# Patient Record
Sex: FEMALE | Race: WHITE | NOT HISPANIC OR LATINO | Employment: STUDENT | URBAN - METROPOLITAN AREA
[De-identification: names, ages, dates, MRNs, and addresses within clinical notes are randomized per-mention and may not be internally consistent; named-entity substitution may affect disease eponyms.]

---

## 2017-01-23 ENCOUNTER — PATIENT MESSAGE (OUTPATIENT)
Dept: ORTHOPEDICS | Facility: CLINIC | Age: 27
End: 2017-01-23

## 2017-01-30 ENCOUNTER — OFFICE VISIT (OUTPATIENT)
Dept: ORTHOPEDICS | Facility: CLINIC | Age: 27
End: 2017-01-30
Payer: COMMERCIAL

## 2017-01-30 VITALS — BODY MASS INDEX: 21.09 KG/M2 | WEIGHT: 119 LBS | HEIGHT: 63 IN

## 2017-01-30 DIAGNOSIS — G56.12 LEFT MEDIAN NERVE NEUROPATHY: Primary | ICD-10-CM

## 2017-01-30 PROCEDURE — 1159F MED LIST DOCD IN RCRD: CPT | Mod: S$GLB,,, | Performed by: ORTHOPAEDIC SURGERY

## 2017-01-30 PROCEDURE — 99213 OFFICE O/P EST LOW 20 MIN: CPT | Mod: S$GLB,,, | Performed by: ORTHOPAEDIC SURGERY

## 2017-01-30 PROCEDURE — 99999 PR PBB SHADOW E&M-EST. PATIENT-LVL II: CPT | Mod: PBBFAC,,, | Performed by: ORTHOPAEDIC SURGERY

## 2017-01-30 NOTE — PROGRESS NOTES
HISTORY OF PRESENT ILLNESS:  Ms. Mustafa in followup of left arm symptoms.  She   did have a previous elbow injury many years ago.  A recent nerve test performed   on the left arm showed evidence of mild left carpal tunnel syndrome.  I went   over these findings with her today.  Her main complaint is weakness and   intermittent numbness, left hand.    PHYSICAL EXAMINATION:  She has atrophy of the left thumb and index finger and   thenar atrophy as well.  Tinel sign is positive at the wrist over the median   nerve, but also positive over the proximal forearm over the median nerve.    IMPRESSION:  1.  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  2.  More new onset left carpal tunnel syndrome, probably activity related.    PLAN:  We discussed some options today including injection, but she would like   to hold off for now because of a big report that she is working on.  She does   well with breaks at school, so I recommended that we continue with her current   break cycle, which gives her 10 minutes rest each hour to rest the left hand and   arm.    I would like her to continue with the brace from time to time.  Keep an eye on   symptoms.  possibly follow up in a week or two for an injection, left wrist for   treatment of carpal tunnel syndrome.      AIDAN  dd: 01/30/2017 13:52:25 (CST)  td: 01/31/2017 08:49:10 (CST)  Doc ID   #[id number]  Job ID #[id number]    CC:

## 2017-02-28 ENCOUNTER — PATIENT MESSAGE (OUTPATIENT)
Dept: ORTHOPEDICS | Facility: CLINIC | Age: 27
End: 2017-02-28

## 2017-03-16 ENCOUNTER — OFFICE VISIT (OUTPATIENT)
Dept: ORTHOPEDICS | Facility: CLINIC | Age: 27
End: 2017-03-16
Payer: COMMERCIAL

## 2017-03-16 VITALS — BODY MASS INDEX: 21.09 KG/M2 | WEIGHT: 119 LBS | HEIGHT: 63 IN

## 2017-03-16 DIAGNOSIS — G56.12 LEFT MEDIAN NERVE NEUROPATHY: ICD-10-CM

## 2017-03-16 DIAGNOSIS — G56.02 CARPAL TUNNEL SYNDROME OF LEFT WRIST: Primary | ICD-10-CM

## 2017-03-16 PROCEDURE — 99213 OFFICE O/P EST LOW 20 MIN: CPT | Mod: 25,S$GLB,, | Performed by: ORTHOPAEDIC SURGERY

## 2017-03-16 PROCEDURE — 1160F RVW MEDS BY RX/DR IN RCRD: CPT | Mod: S$GLB,,, | Performed by: ORTHOPAEDIC SURGERY

## 2017-03-16 PROCEDURE — 99999 PR PBB SHADOW E&M-EST. PATIENT-LVL II: CPT | Mod: PBBFAC,,, | Performed by: ORTHOPAEDIC SURGERY

## 2017-03-16 PROCEDURE — 20526 THER INJECTION CARP TUNNEL: CPT | Mod: LT,S$GLB,, | Performed by: ORTHOPAEDIC SURGERY

## 2017-03-16 RX ORDER — DULOXETIN HYDROCHLORIDE 30 MG/1
30 CAPSULE, DELAYED RELEASE ORAL DAILY
COMMUNITY
End: 2021-04-22

## 2017-03-16 RX ORDER — TRIAMCINOLONE ACETONIDE 40 MG/ML
40 INJECTION, SUSPENSION INTRA-ARTICULAR; INTRAMUSCULAR
Status: COMPLETED | OUTPATIENT
Start: 2017-03-16 | End: 2017-03-16

## 2017-03-16 RX ADMIN — TRIAMCINOLONE ACETONIDE 40 MG: 40 INJECTION, SUSPENSION INTRA-ARTICULAR; INTRAMUSCULAR at 02:03

## 2017-03-16 NOTE — PROGRESS NOTES
HISTORY OF PRESENT ILLNESS:  Ms. Mustafa in followup of left hand symptoms   related to chronic median nerve dysfunction with superimposed carpal tunnel   syndrome, still having the same symptoms in her left hand intermittent numbness,   weakness and difficulty with use.    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  Mildly positive Tinel sign over the median nerve at the wrist.    PLAN:  I recommended and performed an injection left carpal tunnel, combination   of Kenalog 10 mg, 0.5 mL Xylocaine, sterile technique.    I have also recommended we follow this up with a little bit of therapy ordered   at [facility] close to her house for strengthening left hand.  I will be curious to   see if this helps.  If not, we may consider surgical release in the future.      AIDAN  dd: 03/16/2017 14:22:48 (CDT)  td: 03/17/2017 07:52:45 (CDT)  Doc ID   #[id number]  Job ID #[id number]    CC:

## 2017-03-26 DIAGNOSIS — G56.12 LEFT MEDIAN NERVE NEUROPATHY: ICD-10-CM

## 2017-03-26 DIAGNOSIS — M79.642 PAIN OF LEFT HAND: ICD-10-CM

## 2017-03-27 RX ORDER — GABAPENTIN 300 MG/1
CAPSULE ORAL
Qty: 30 CAPSULE | Refills: 3 | Status: SHIPPED | OUTPATIENT
Start: 2017-03-27 | End: 2021-04-22

## 2017-04-04 ENCOUNTER — CLINICAL SUPPORT (OUTPATIENT)
Dept: REHABILITATION | Facility: HOSPITAL | Age: 27
End: 2017-04-04
Attending: ORTHOPAEDIC SURGERY
Payer: COMMERCIAL

## 2017-04-04 DIAGNOSIS — M62.542 ATROPHY OF MUSCLE OF LEFT HAND: ICD-10-CM

## 2017-04-04 DIAGNOSIS — R29.898 DECREASED GRIP STRENGTH OF LEFT HAND: Primary | ICD-10-CM

## 2017-04-04 PROCEDURE — 97165 OT EVAL LOW COMPLEX 30 MIN: CPT

## 2017-04-04 PROCEDURE — 97110 THERAPEUTIC EXERCISES: CPT

## 2017-04-06 NOTE — PLAN OF CARE
Occupational Therapy -Hand / Wrist  Evaluation    Patient: Hailey Mustafa  Date of Evaluation: 2017  Referring Physician:  Dr. Villarreal  Diagnosis: Left carpal tunnel syndrome  1. Decreased  strength of left hand     2. Atrophy of muscle of left hand       MRN: [id number]    Referral Orders:   Eval and treat     Start Time: 8:15  End Time: 9:00  Total Time: 45 min     Hand dominance: Right    Occupation:  Law student  Working presently:  no  Workmen's Compensation:  no    Date of onset: many years ago  Involved area:  Left hand  Mechanism of Injury:   Trauma , Pt reports that she fractured her elbow when she was [age] and thinks she injured her median nerve at that time.  Past Medical History/Physical Systems Review:     Environmental Concerns/ Fall Risk:  None  Barriers to Learning: None   Cultural/Spiritual : None   Developmental/Education: None   Abuse/ Neglect: none   Nutritional Deficit: None   Language: None   Hearing/Vision Deficit: None   Other:      Subjective:  Pt reports I have difficulty typing, opening jars, opening doors, and picking objects up off the floor using my left hand.    Pain   At rest: 0 out of 10  With work/ Activity: 0 out of 10  Sleepin out of 10  Location of Pain : none    Patients goals for therapy are:  increase strength in left hand    Objective:   Observation  :Atrophy, penciling of thumb and index, thenar eminence atrophy    Sensation: [place] Margi testing shows diminished protective sensation throughout median nerve distribution. Pt also complains of tingling in her left hand.  Scar / Wound:N/A  Edema:  none            Range of Motion:                                                   WNL ROM throughout left hand with the following exception:   Index Left Right  MP 84 86  PIP 65 103  DIP 55 75                                         Strength: (SB Dynamometer in lbs.), Average 3 trials, Position II: Right 16, Left 5        Pinch Strength: (Pinch Gauge  in psis), Average 3 trials:   Right Left  Key 10 4  3 pt 7 0  2 pt 5 0          Functional Limitations:   Self Care / ADL: Limited use of left hand for ADLs, grooming, hygiene  Work/Activities: Limited use of left hand for typing       Treatment Included:   OT evaluation and instruction in written HEP including  jt blocking, tendon glides, ab/adductin, finger ext 10 reps ea and yellow putty for molding 2', grasping 2', 2 logs ea for key, 3pt and 2pt pinch. Pt was provided with yellow theraputty.    Treatment: OT eval 30 min, ther-ex x 15 min    Patient demonstrates good understanding of HEP.   Assessment:   Problem List :   Decreased ROM   Decreased  and pinch strength   Decreased muscle strength   Decreased functional hand use     History Examination Decision Making Complexity Score   Occupational Profile: Did an brief chart review        Medical and Therapy History:     Comorbidities that may affect POC include: previous elbow fx. With nerve damage since she was 10          Low   Performance Deficits   Nondominant UE affected    Physical  Decreased A/PROM/MMT grossly in left hand, All of which inhibit pt's [place] with ADL's, IADL's;     Cognitive  N/A      Psychosocial:    Pt has difficulty  performing the following: typing, fine motor activiities all of which were a part of pt's habits, roles, routines, interpersonal interactions prior to injury/surgey     Low     Pt has several treatment options including soft tissue mobs, joint mobs, therex, therapeutic activity, education, endurance training, modalities etc.      Discussed goals and Pt in agreement with goals.    Issued HEP at eval and pt able to perform all with minimal verbal and tactile cues provided by OT. Pt able to complete all without c/o and demonstrating good technique    Low Low                 Goals: ( 6 weeks)   1)  Patient to be IND with HEP.  2)  Increase ROM 3-5 degrees to increase functional hand use for ADLs/work/leisure  activities  3)   Increase  strength 3-5 lbs. to grasp objects using her left hand  4)   Increase pinch 1-3 psis for  Fine motor activities      Plan:   Patient to be treated by Occupational Therapy    1   Time  per week for   6                   weeks  during the certification period from    4/4/2017     to  5/19/2017   to achieve the established goals.  Treatment to include :  paraffin, fluidotherapy, manual therapy/joint mobilizations,  modalities for pain management, US 3mhz, therapeutic exercises/activities, strengthening, as well as any other treatments deemed necessary based on the patient's needs or progress.               I certify the need for these services furnished under this plan of treatment and while under my care    ____________________________________                         __________________  Physician/Referring Practitioner                                               Date of Signature

## 2017-04-12 ENCOUNTER — CLINICAL SUPPORT (OUTPATIENT)
Dept: REHABILITATION | Facility: HOSPITAL | Age: 27
End: 2017-04-12
Payer: COMMERCIAL

## 2017-04-12 ENCOUNTER — PATIENT MESSAGE (OUTPATIENT)
Dept: ORTHOPEDICS | Facility: CLINIC | Age: 27
End: 2017-04-12

## 2017-04-12 DIAGNOSIS — R29.898 DECREASED GRIP STRENGTH OF LEFT HAND: Primary | ICD-10-CM

## 2017-04-12 DIAGNOSIS — G56.12 LEFT MEDIAN NERVE NEUROPATHY: ICD-10-CM

## 2017-04-12 DIAGNOSIS — M62.542 ATROPHY OF MUSCLE OF LEFT HAND: ICD-10-CM

## 2017-04-12 PROCEDURE — 97110 THERAPEUTIC EXERCISES: CPT

## 2017-04-12 PROCEDURE — 97018 PARAFFIN BATH THERAPY: CPT | Mod: 59

## 2017-04-12 PROCEDURE — 97140 MANUAL THERAPY 1/> REGIONS: CPT

## 2017-04-21 ENCOUNTER — CLINICAL SUPPORT (OUTPATIENT)
Dept: REHABILITATION | Facility: HOSPITAL | Age: 27
End: 2017-04-21
Attending: ORTHOPAEDIC SURGERY
Payer: COMMERCIAL

## 2017-04-21 DIAGNOSIS — R29.898 DECREASED GRIP STRENGTH OF LEFT HAND: Primary | ICD-10-CM

## 2017-04-21 DIAGNOSIS — M62.542 ATROPHY OF MUSCLE OF LEFT HAND: ICD-10-CM

## 2017-04-21 PROCEDURE — 97110 THERAPEUTIC EXERCISES: CPT

## 2017-04-21 PROCEDURE — 97530 THERAPEUTIC ACTIVITIES: CPT

## 2017-04-21 PROCEDURE — 97018 PARAFFIN BATH THERAPY: CPT

## 2017-04-21 NOTE — PROGRESS NOTES
"OT Daily Progress Note    Patient:  Hailey Blanco [facility] #:  [id number]   Date of Note: 04/21/2017   Referring Physician:  Luis Alberto Villarreal Jr., *  Diagnosis:    Encounter Diagnoses   Name Primary?    Decreased  strength of left hand Yes    Atrophy of muscle of left hand     Visit: 3    Start Time: 11:15  End Time: 12:00  Total Time: 45 min  Group Time: 0      Subjective:   "I keep dropping things. I think it is because my hand is tired."  Pain: 0 out of 10     Objective:   Patient seen by OT this session. Treatment  consist of the following:     Patient received paraffin bath to L hand(s) for 10 minutes to increase blood flow, circulation, pain management and for tissue elasticity prior to therex.  Pt performed jt blocking to thumb and IF, tendon glides, ab/adducting, opposition, finger ext 10 reps ea. Pt performed light hand strengthening with yellow putty for molding 2', grasping x 10 reps, 2 logs ea for key, 3pt and 2pt pinch, finger add. Pt performed stereognosis activity, also addressing FM coordination, encouraging tip and 3 point pinch. Pt performed functional FM coordination and in-hand manipulation activities using grooved pegs, and various sized items.     Treatment: Paraffin x 10 min, Therapeutic exercises x 20 min and Therapeutic activities x 15 min     Assessment:  Pt tolerated treatment fairly well today.  Pt reported fatigue after ex, and required rest breaks. Pt cont to present with hand weakness, impaired coordination, and decreased functional use of L hand.  She cont to have difficulty with FM activities, including typing, buttoning, opening jars and bottles, and picking up objects with L hand.  All of which limit her functionally with ADLs and IADLs.  Pt will continue to benefit from skilled OT intervention.   Patient is making good progress toward established goals.       Goals: ( 6 weeks)   1) Patient to be IND with HEP.  2) Increase ROM 3-5 degrees to increase functional hand " use for ADLs/work/leisure activities  3) Increase  strength 3-5 lbs. to grasp objects using her left hand  4) Increase pinch 1-3 psis for Fine motor activities    Plan:  Continue 2x week x 6 weeks  during the certification period from 4/4/17 to 5/19/17  in pursuit of  OT goals.      FREDERIC Mc

## 2017-04-25 ENCOUNTER — PATIENT MESSAGE (OUTPATIENT)
Dept: ORTHOPEDICS | Facility: CLINIC | Age: 27
End: 2017-04-25

## 2017-04-26 ENCOUNTER — CLINICAL SUPPORT (OUTPATIENT)
Dept: REHABILITATION | Facility: HOSPITAL | Age: 27
End: 2017-04-26
Attending: ORTHOPAEDIC SURGERY
Payer: COMMERCIAL

## 2017-04-26 DIAGNOSIS — M62.542 ATROPHY OF MUSCLE OF LEFT HAND: ICD-10-CM

## 2017-04-26 DIAGNOSIS — R29.898 DECREASED GRIP STRENGTH OF LEFT HAND: Primary | ICD-10-CM

## 2017-04-26 PROCEDURE — 97110 THERAPEUTIC EXERCISES: CPT

## 2017-04-26 PROCEDURE — 97018 PARAFFIN BATH THERAPY: CPT | Mod: 59

## 2017-04-26 PROCEDURE — 97140 MANUAL THERAPY 1/> REGIONS: CPT

## 2017-05-02 ENCOUNTER — PATIENT MESSAGE (OUTPATIENT)
Dept: ORTHOPEDICS | Facility: CLINIC | Age: 27
End: 2017-05-02

## 2017-05-03 ENCOUNTER — CLINICAL SUPPORT (OUTPATIENT)
Dept: REHABILITATION | Facility: HOSPITAL | Age: 27
End: 2017-05-03
Attending: ORTHOPAEDIC SURGERY
Payer: COMMERCIAL

## 2017-05-03 DIAGNOSIS — M62.542 ATROPHY OF MUSCLE OF LEFT HAND: ICD-10-CM

## 2017-05-03 DIAGNOSIS — R29.898 DECREASED GRIP STRENGTH OF LEFT HAND: Primary | ICD-10-CM

## 2017-05-03 PROCEDURE — 97018 PARAFFIN BATH THERAPY: CPT | Mod: 59

## 2017-05-03 PROCEDURE — 97110 THERAPEUTIC EXERCISES: CPT

## 2017-05-03 NOTE — PROGRESS NOTES
"OT Daily Progress Note    Patient:  Hailey Blanco [facility] #:  [id number]   Date of Note: 05/03/2017   Referring Physician:  Luis Alberto Villarreal Jr., *  Diagnosis:  Left carpal tunnel, atrophy of hant  Encounter Diagnoses   Name Primary?    Decreased  strength of left hand Yes    Atrophy of muscle of left hand     Visit: 5, NO FOTO     Start Time: 9:15  End Time: 10:00  Total Time: 45 min  Group Time: 0      Subjective:   "I was able to shuffle cards last night, that was a big deal.  I still have trouble with feeling on my fingertips and wrist strength.  The coordination is getting better.  I have 2 exams left.  I missed my appt with Dr. Villarreal, I will reschedule after my exams."  Pain: 0 out of 10     Objective:   Patient seen by OT this session. Treatment  consist of the following:     Patient received paraffin bath to L hand(s) for 10 minutes to increase blood flow, circulation, pain management and for tissue elasticity prior to therex.  Pt performed jt blocking to thumb and IF, tendon glides, ab/adducting, opposition, finger ext 10 reps ea. Pt performed light hand strengthening with yellow putty for molding 2', grasping x 10 reps, 2 logs ea for key, 3pt and 2pt pinch, finger add and lumbrical squeeze x 10 reps.  Added IP raking for FDP /FDS strengthening x 10 reps.  Instructed in use of velcro on keyboard, items for sensory perceptual activities like typing.  Added bonny tape to index/middle to assist with yellow digiflex and 25 lbs. PHG for  strengthening x 10 reps each.  Added 2# wrist strengthening for flex, ext, RD, UD 2 x 10 reps each.   and Pt performed  Stereognosis task with rice and various items x 5 min.        Treatment: Paraffin x 10 min, Therapeutic exercises x 35 min      Assessment:  Pt tolerated treatment fairly well today.  Pt reported fatigue after ex, and required rest breaks. Pt cont to present with hand weakness, impaired coordination, and decreased functional use of L hand.  " She cont to have difficulty with FM activities, including typing, buttoning, opening jars and bottles, and picking up objects with L hand.  All of which limit her functionally with ADLs and IADLs.  Pt will continue to benefit from skilled OT intervention.   Patient is making good progress toward established goals.       Goals: ( 6 weeks)   1) Patient to be IND with HEP.  2) Increase ROM 3-5 degrees to increase functional hand use for ADLs/work/leisure activities  3) Increase  strength 3-5 lbs. to grasp objects using her left hand  4) Increase pinch 1-3 psis for Fine motor activities    Plan:  Continue 1-2x week x 6-8 weeks  during the certification period from 4/4/17 to 5/19/17  in pursuit of  OT goals.

## 2017-05-10 ENCOUNTER — PATIENT MESSAGE (OUTPATIENT)
Dept: ORTHOPEDICS | Facility: CLINIC | Age: 27
End: 2017-05-10

## 2017-05-11 ENCOUNTER — OFFICE VISIT (OUTPATIENT)
Dept: ORTHOPEDICS | Facility: CLINIC | Age: 27
End: 2017-05-11
Payer: COMMERCIAL

## 2017-05-11 VITALS — BODY MASS INDEX: 21.09 KG/M2 | WEIGHT: 119 LBS | HEIGHT: 63 IN

## 2017-05-11 DIAGNOSIS — G56.12 LEFT MEDIAN NERVE NEUROPATHY: Primary | ICD-10-CM

## 2017-05-11 PROCEDURE — 99213 OFFICE O/P EST LOW 20 MIN: CPT | Mod: S$GLB,,, | Performed by: ORTHOPAEDIC SURGERY

## 2017-05-11 PROCEDURE — 99999 PR PBB SHADOW E&M-EST. PATIENT-LVL II: CPT | Mod: PBBFAC,,, | Performed by: ORTHOPAEDIC SURGERY

## 2017-05-11 PROCEDURE — 1160F RVW MEDS BY RX/DR IN RCRD: CPT | Mod: S$GLB,,, | Performed by: ORTHOPAEDIC SURGERY

## 2017-05-11 NOTE — PROGRESS NOTES
"OT Daily Progress Note    Patient:  Hailey Blanco [facility] #:  [id number]   Date of Note: 4/26/2017  Referring Physician:  Luis Alberto Villarreal Jr., *  Diagnosis:    Encounter Diagnoses   Name Primary?    Decreased  strength of left hand Yes    Atrophy of muscle of left hand     Visit: 4    Start Time: 8:15  End Time: 9  Total Time: 45 min  Group Time: 0      Subjective:   "I haven't done much this week, my hand is really tired, I'm in the middle of exams. "  Pain: 0 out of 10     Objective:   Patient seen by OT this session. Treatment  consist of the following:     Patient received paraffin bath to L hand(s) for 10 minutes to increase blood flow, circulation, pain management and for tissue elasticity prior to therex.  Pt performed jt blocking to thumb and IF, tendon glides, ab/adducting, opposition, finger ext 10 reps ea.    Pt performed light hand strengthening with yellow putty for molding 2', grasping x 10 reps, 2 logs ea for key, 3pt and 2pt pinch, thumb adduction x 10 reps.  Instructed and performed lumbrical squeeze with putty and putty tool #2 for lumbrical wave in putty x 10 reps.  Instructed and performed wrist isometrics for flex/ext x 10 reps each for wrist strengthening.   Pt performed stereognosis activity with rice x 5 min, Performed FM coordination activity with grooved pegboard to promote 2 and 3pt pinch x 5 min.   Reviewed desensitization with various textures and encouraged wrist strengthening with isometrics for home ex program.  Patient reported good understanding of same    Treatment: Paraffin x 10 min, Therapeutic exercises x 20 min and Therapeutic activities x 15 min     Assessment:  Pt tolerated treatment  well today.  Pt reported fatigue after ex, and required several short rest breaks. Pt cont to present with hand weakness, impaired coordination, and decreased functional use of L hand.  Diminished sensation continues in index with limited use for 2 pt pinch.   She cont to have " difficulty with FM activities, including typing, buttoning, opening jars and bottles, and picking up objects with L hand.  All of which limit her functionally with ADLs and IADLs.  Pt will continue to benefit from skilled OT intervention.   Patient is making good progress toward established goals.       Goals: ( 6 weeks)   1) Patient to be IND with HEP.  2) Increase ROM 3-5 degrees to increase functional hand use for ADLs/work/leisure activities  3) Increase  strength 3-5 lbs. to grasp objects using her left hand  4) Increase pinch 1-3 psis for Fine motor activities    Plan:  Continue 1-2x week x 6-8 weeks  during the certification period from 4/4/17 to 5/19/17  in pursuit of  OT goals.

## 2017-05-11 NOTE — PROGRESS NOTES
HISTORY OF PRESENT ILLNESS:  Ms. Mustafa in followup of median nerve palsy, left   arm, related to an old injury, is doing better.  The injection helped out at   the wrist and the carpal tunnel area and the therapy has helped that as well.    She is scheduled to move in a few weeks, so she will be relocating to   [place].    PHYSICAL EXAMINATION:  EXTREMITIES:  Left hand demonstrates thenar atrophy, left hand, and weakness   consistent with median nerve palsy.  Tinel sign negative at the wrist.     strength is improved and she has pretty good thumb opposition.    PLAN:  I would like her to finish up therapy.  Continue anti-inflammatory   medication by mouth.  She really does not think the Neurontin is helping, so I   think she can discontinue that.    I would like her to continue exercises after she moves, possibly follow up with   a hand surgeon in [place].  The option of tendon transfer is certainly a   possibility for her, but she does quite well and may not need that.  Follow up   with me ruslan BERMUDEZ  dd: 05/11/2017 08:42:40 (TRACY)  td: 05/12/2017 02:32:52 (TRACY)  Doc ID   #[id number]  Job ID #[id number]    CC:

## 2017-05-29 NOTE — PROGRESS NOTES
"OT Daily Progress Note     Patient:  Hailey Blanco [facility] #:  [id number]   Date of Note: 4/12/2017  Referring Physician:  Luis Alberto Villarreal Jr., *  Diagnosis:         Encounter Diagnoses   Name Primary?    Decreased  strength of left hand Yes    Atrophy of muscle of left hand      Visit: 2     Start Time: 10:15  End Time: 11  Total Time: 45 min  Group Time: 0        Subjective:   "I find my hand gets tired easily and cramps up a little. "  Pain: 0 out of 10      Objective:   Patient seen by OT this session. Treatment  consist of the following:      Patient received paraffin bath to L hand(s) for 10 minutes to increase blood flow, circulation, pain management and for tissue elasticity prior to therex.  Pt performed jt blocking to thumb and IF, tendon glides, ab/adducting, opposition, finger ext 10 reps ea.    Pt performed light hand strengthening with yellow putty for molding 2', grasping x 10 reps, 2 logs ea for key, 3pt and 2pt pinch, thumb adduction x 10 reps.  Instructed and performed lumbrical squeeze with putty and putty tool #2 for lumbrical wave in putty x 10 reps.   Added yellow putty for thumb/small finger opposition to strengthening thenar/hypothenar regions.   Instructed and performed wrist isometrics for flex/ext x 10 reps each and 2# wrist flex, ext, RD, UD x 10 reps each  for wrist strengthening.   Performed FM coordination activity with grooved pegboard to promote 2 and 3pt pinch x 5 min.  Added rice with various items for desensitization, nerve re-education and stereognosis task for index/thumb x 5 min  Patient reported good understanding of  Upgraded HEP.      Treatment: Paraffin x 10 min, Therapeutic exercises x 20 min and Therapeutic activities x 15 min     Assessment:  Pt tolerated treatment  well today.  Pt reported fatigue after ex, and required several short rest breaks between activities. Pt cont to present with hand weakness, impaired coordination, and decreased functional use " of L hand.  Diminished sensation continues in index with limited use for 2 pt pinch.   She cont to have difficulty with FM activities, including typing, buttoning, opening jars and bottles, and picking up objects with L hand.  All of which limit her functionally with ADLs and IADLs.  Pt will continue to benefit from skilled OT intervention.   Patient is making good progress toward established goals.        Goals: ( 6 weeks)   1) Patient to be IND with HEP.  2) Increase ROM 3-5 degrees to increase functional hand use for ADLs/work/leisure activities  3) Increase  strength 3-5 lbs. to grasp objects using her left hand  4) Increase pinch 1-3 psis for Fine motor activities     Plan:  Continue 1-2x week x 6-8 weeks  during the certification period from 4/4/17 to 5/19/17  in pursuit of  OT goals.

## 2018-01-10 ENCOUNTER — PATIENT MESSAGE (OUTPATIENT)
Dept: ORTHOPEDICS | Facility: CLINIC | Age: 28
End: 2018-01-10

## 2018-07-17 ENCOUNTER — PATIENT MESSAGE (OUTPATIENT)
Dept: ORTHOPEDICS | Facility: CLINIC | Age: 28
End: 2018-07-17

## 2019-02-12 ENCOUNTER — PATIENT MESSAGE (OUTPATIENT)
Dept: ORTHOPEDICS | Facility: CLINIC | Age: 29
End: 2019-02-12

## 2019-02-21 ENCOUNTER — OFFICE VISIT (OUTPATIENT)
Dept: ORTHOPEDICS | Facility: CLINIC | Age: 29
End: 2019-02-21
Payer: COMMERCIAL

## 2019-02-21 VITALS — WEIGHT: 119 LBS | BODY MASS INDEX: 21.09 KG/M2 | HEIGHT: 63 IN

## 2019-02-21 DIAGNOSIS — G56.12 LEFT MEDIAN NERVE NEUROPATHY: Primary | ICD-10-CM

## 2019-02-21 PROCEDURE — 99213 PR OFFICE/OUTPT VISIT, EST, LEVL III, 20-29 MIN: ICD-10-PCS | Mod: 25,S$GLB,, | Performed by: ORTHOPAEDIC SURGERY

## 2019-02-21 PROCEDURE — 20526 PR INJECT CARPAL TUNNEL: ICD-10-PCS | Mod: LT,S$GLB,, | Performed by: ORTHOPAEDIC SURGERY

## 2019-02-21 PROCEDURE — 3008F PR BODY MASS INDEX (BMI) DOCUMENTED: ICD-10-PCS | Mod: CPTII,S$GLB,, | Performed by: ORTHOPAEDIC SURGERY

## 2019-02-21 PROCEDURE — 3008F BODY MASS INDEX DOCD: CPT | Mod: CPTII,S$GLB,, | Performed by: ORTHOPAEDIC SURGERY

## 2019-02-21 PROCEDURE — 99213 OFFICE O/P EST LOW 20 MIN: CPT | Mod: 25,S$GLB,, | Performed by: ORTHOPAEDIC SURGERY

## 2019-02-21 PROCEDURE — 99999 PR PBB SHADOW E&M-EST. PATIENT-LVL II: ICD-10-PCS | Mod: PBBFAC,,, | Performed by: ORTHOPAEDIC SURGERY

## 2019-02-21 PROCEDURE — 20526 THER INJECTION CARP TUNNEL: CPT | Mod: LT,S$GLB,, | Performed by: ORTHOPAEDIC SURGERY

## 2019-02-21 PROCEDURE — 99999 PR PBB SHADOW E&M-EST. PATIENT-LVL II: CPT | Mod: PBBFAC,,, | Performed by: ORTHOPAEDIC SURGERY

## 2019-02-21 RX ORDER — TRIAMCINOLONE ACETONIDE 40 MG/ML
20 INJECTION, SUSPENSION INTRA-ARTICULAR; INTRAMUSCULAR
Status: COMPLETED | OUTPATIENT
Start: 2019-02-21 | End: 2019-02-21

## 2019-02-21 RX ADMIN — TRIAMCINOLONE ACETONIDE 20 MG: 40 INJECTION, SUSPENSION INTRA-ARTICULAR; INTRAMUSCULAR at 02:02

## 2019-02-21 NOTE — PROGRESS NOTES
HISTORY OF PRESENT ILLNESS:  Ms. Mustafa seen previously for left carpal tunnel   syndrome.  She does have thenar atrophy left hand related to an old injury.  The   injections helped from time to time.  She is currently scheduled take a bar   exam and she would like an injection to help with no symptoms.    PHYSICAL EXAMINATION:  LEFT HAND:  She does have some mild swelling in the palm.  Positive Tinel sign.    Negative Phalen test and she does have thenar atrophy.    IMPRESSION:  Median nerve symptoms, left hand related to old injury with   superimposed carpal tunnel syndrome.    PLAN:  After pause for timeout, she identified the left wrist, injected with   Kenalog 20 mg, 0.5 mL Xylocaine, sterile technique.  Tolerated the procedure   well without complication.    I have also recommended she continue with the wrist splint at night, keep an eye   on symptoms and if symptoms persist, consider surgical treatment.      AIDAN  dd: 02/21/2019 14:41:47 (CST)  td: 02/22/2019 08:01:07 (CST)  Doc ID   #[id number]  Job ID #[id number]    CC:

## 2019-10-18 ENCOUNTER — OFFICE VISIT (OUTPATIENT)
Dept: OBSTETRICS AND GYNECOLOGY | Facility: CLINIC | Age: 29
End: 2019-10-18
Payer: COMMERCIAL

## 2019-10-18 VITALS
DIASTOLIC BLOOD PRESSURE: 78 MMHG | SYSTOLIC BLOOD PRESSURE: 116 MMHG | WEIGHT: 122.13 LBS | BODY MASS INDEX: 21.64 KG/M2 | HEIGHT: 63 IN

## 2019-10-18 DIAGNOSIS — Z01.411 ENCOUNTER FOR WELL WOMAN EXAM WITH ABNORMAL FINDINGS: Primary | ICD-10-CM

## 2019-10-18 DIAGNOSIS — Z11.3 SCREEN FOR STD (SEXUALLY TRANSMITTED DISEASE): ICD-10-CM

## 2019-10-18 LAB
C TRACH DNA SPEC QL NAA+PROBE: NOT DETECTED
N GONORRHOEA DNA SPEC QL NAA+PROBE: NOT DETECTED

## 2019-10-18 PROCEDURE — 99999 PR PBB SHADOW E&M-EST. PATIENT-LVL II: ICD-10-PCS | Mod: PBBFAC,,, | Performed by: OBSTETRICS & GYNECOLOGY

## 2019-10-18 PROCEDURE — 99385 PREV VISIT NEW AGE 18-39: CPT | Mod: S$GLB,,, | Performed by: OBSTETRICS & GYNECOLOGY

## 2019-10-18 PROCEDURE — 87491 CHLMYD TRACH DNA AMP PROBE: CPT

## 2019-10-18 PROCEDURE — 88175 CYTOPATH C/V AUTO FLUID REDO: CPT

## 2019-10-18 PROCEDURE — 99385 PR PREVENTIVE VISIT,NEW,18-39: ICD-10-PCS | Mod: S$GLB,,, | Performed by: OBSTETRICS & GYNECOLOGY

## 2019-10-18 PROCEDURE — 99999 PR PBB SHADOW E&M-EST. PATIENT-LVL II: CPT | Mod: PBBFAC,,, | Performed by: OBSTETRICS & GYNECOLOGY

## 2019-10-18 NOTE — PROGRESS NOTES
Subjective:       Patient ID: Hailey Mustafa is a 28 y.o. female.    Chief Complaint:  Well Woman    History of Present Illness:  HPI  SUBJECTIVE:   28 y.o. female  here for annual.     She describes her periods as irregular, lasting 2-3 days. light flow.  denies break through bleeding.   denies vaginal itching or irritation.  denies vaginal discharge.    She is sexually active with 1 partner.  She uses Mirena IUD for contraception since 2017.  Patient is an , currently works for a .     History of abnormal pap: No. She has received the HPV vaccine.  Last Pap: 2016 - NILM  Last MMG: No  Last Colonoscopy:  No    FH: Denies family history of breast, GYN or colon cancer.    GYN & OB History  No LMP recorded (lmp unknown). (Menstrual status: Birth Control).   Date of Last Pap: No result found    OB History    Para Term  AB Living   0 0 0 0 0 0   SAB TAB Ectopic Multiple Live Births   0 0 0 0 0       Review of Systems  Review of Systems   Constitutional: Negative for chills, diaphoresis, fatigue and fever.   Respiratory: Negative for cough and shortness of breath.    Cardiovascular: Negative for chest pain and palpitations.   Gastrointestinal: Negative for abdominal pain, constipation, diarrhea, nausea and vomiting.   Genitourinary: Negative for dysmenorrhea, dysuria, frequency, menorrhagia, menstrual problem, pelvic pain, vaginal bleeding, vaginal discharge and vaginal pain.   Musculoskeletal: Negative for back pain and myalgias.   Integumentary:  Negative for rash, acne, breast mass, nipple discharge and breast skin changes.   Neurological: Negative for headaches.   Psychiatric/Behavioral: Negative for depression. The patient is not nervous/anxious.    Breast: Negative for mass, mastodynia, nipple discharge and skin changes          Objective:    Physical Exam:   Constitutional: She is oriented to person, place, and time. She appears well-developed and well-nourished. No  distress.    HENT:   Head: Normocephalic and atraumatic.    Eyes: EOM are normal.    Neck: Normal range of motion. No thyromegaly present.     Pulmonary/Chest: Effort normal. She exhibits no mass and no tenderness. Right breast exhibits no inverted nipple, no mass, no nipple discharge, no skin change, no tenderness and no swelling. Left breast exhibits no inverted nipple, no mass, no nipple discharge, no skin change, no tenderness and no swelling. Breasts are symmetrical.        Abdominal: Soft. She exhibits no distension and no mass. There is no tenderness. There is no rebound and no guarding. No hernia.     Genitourinary:   Genitourinary Comments: Normal external female genitalia; vagina rugated, normal; cervix normal, no masses; uterus small mobile nontender; no adnexal masses palpated; rectal deferred             Musculoskeletal: Normal range of motion and moves all extremeties.       Neurological: She is alert and oriented to person, place, and time.    Skin: Skin is warm. No rash noted.    Psychiatric: She has a normal mood and affect. Her behavior is normal. Judgment and thought content normal.          Assessment:        1. Encounter for well woman exam with abnormal findings    2. Screen for STD (sexually transmitted disease)             Plan:      Hailey was seen today for well woman.    Diagnoses and all orders for this visit:    Encounter for well woman exam with abnormal findings  - Pap guidelines discussed. Pap collected.  - MMG age 41.   - Contraception - Continue Mirena IUD  - STD screening - GCCT collected.  - Brief preconception counseling discussed.    -     Liquid-based pap smear, screening  -     C. trachomatis/N. gonorrhoeae by AMP DNA    Screen for STD (sexually transmitted disease)  -     C. trachomatis/N. gonorrhoeae by AMP DNA    Orders Placed This Encounter   Procedures    C. trachomatis/N. gonorrhoeae by AMP DNA       Follow up in about 1 year (around 10/18/2020) for annual.

## 2019-11-27 ENCOUNTER — OFFICE VISIT (OUTPATIENT)
Dept: ORTHOPEDICS | Facility: CLINIC | Age: 29
End: 2019-11-27
Attending: ORTHOPAEDIC SURGERY
Payer: COMMERCIAL

## 2019-11-27 DIAGNOSIS — G56.12 LEFT MEDIAN NERVE NEUROPATHY: Primary | ICD-10-CM

## 2019-11-27 PROCEDURE — 99213 PR OFFICE/OUTPT VISIT, EST, LEVL III, 20-29 MIN: ICD-10-PCS | Mod: 25,S$GLB,, | Performed by: ORTHOPAEDIC SURGERY

## 2019-11-27 PROCEDURE — 99213 OFFICE O/P EST LOW 20 MIN: CPT | Mod: 25,S$GLB,, | Performed by: ORTHOPAEDIC SURGERY

## 2019-11-27 PROCEDURE — 99999 PR PBB SHADOW E&M-EST. PATIENT-LVL II: CPT | Mod: PBBFAC,,, | Performed by: ORTHOPAEDIC SURGERY

## 2019-11-27 PROCEDURE — 20526 PR INJECT CARPAL TUNNEL: ICD-10-PCS | Mod: LT,S$GLB,, | Performed by: ORTHOPAEDIC SURGERY

## 2019-11-27 PROCEDURE — 20526 THER INJECTION CARP TUNNEL: CPT | Mod: LT,S$GLB,, | Performed by: ORTHOPAEDIC SURGERY

## 2019-11-27 PROCEDURE — 99999 PR PBB SHADOW E&M-EST. PATIENT-LVL II: ICD-10-PCS | Mod: PBBFAC,,, | Performed by: ORTHOPAEDIC SURGERY

## 2019-11-27 RX ORDER — TRIAMCINOLONE ACETONIDE 40 MG/ML
20 INJECTION, SUSPENSION INTRA-ARTICULAR; INTRAMUSCULAR
Status: COMPLETED | OUTPATIENT
Start: 2019-11-27 | End: 2019-11-27

## 2019-11-27 RX ADMIN — TRIAMCINOLONE ACETONIDE 20 MG: 40 INJECTION, SUSPENSION INTRA-ARTICULAR; INTRAMUSCULAR at 03:11

## 2019-11-27 NOTE — PROGRESS NOTES
Subjective:      Patient ID: Hailey Mustafa is a 28 y.o. female.  Chief Complaint: Numbness and Pain of the Left Hand      HPI  Hailey Mustafa is a  28 y.o. female presenting today for follow up of left hand symptoms related to left carpal tunnel syndrome as well as previous injury to her left thumb.  She reports that she is having a flare-up of numbness tingling in the left hand as well as pain from the carpal tunnel  However she reminded me that she has been unable to flex the left thumb related to an old injury many years ago we had a discussion today about whether that could be fixed in the future specifically I told her that we could do a tendon transfer most likely taking the FDS ring flexor tendon and transferring it to the left thumb flexor tendon. However we might need to do this is a 2 stage compared to a 1 stage tendon transfer carpal tunnel release as a separate procedure in the future  She might like to consider this in the future also she might like to consider.    Review of patient's allergies indicates:   Allergen Reactions    Sandy     Venom-honey bee          Current Outpatient Medications   Medication Sig Dispense Refill    duloxetine (CYMBALTA) 30 MG capsule Take 30 mg by mouth once daily.      gabapentin (NEURONTIN) 300 MG capsule TAKE 1 CAPSULE BY MOUTH EVERY EVENING (Patient not taking: Reported on 11/27/2019) 30 capsule 3    naproxen (NAPROSYN) 250 MG tablet Take 250 mg by mouth 2 (two) times daily.       No current facility-administered medications for this visit.        No past medical history on file.    No past surgical history on file.    OBJECTIVE:   PHYSICAL EXAM:       Vitals:    11/27/19 1424   PainSc:   6     Ortho/SPM Exam  Examination left hand there is atrophy of the thenar muscle lack of FPL function no active flexion of the IP joint left thumb  Positive Tinel sign at the wrist sensation slightly decreased left thumb and index  finger    RADIOGRAPHS:  None  Comments: I have personally reviewed the imaging and I agree with the above radiologist's report.    ASSESSMENT/PLAN:     IMPRESSION:  1.  Left carpal tunnel syndrome.  2.  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  Lack of FPL function left thumb related to old injury    PLAN:  Patient would like injection today after pause for time-out identified the left wrist injected left carpal tunnel with combination Kenalog 20 mg 0.5 cc xylocaine sterile technique  Tolerated the procedure well without complication  Continue wrist splint at night  Follow-up 2 months for recheck  At that time we may consider setting up surgery for left carpal tunnel release or possibly combined tendon transfer FDS left ring to FPL left thumb    FOLLOW UP:  2 months    Disclaimer: This note has been generated using voice-recognition software. There may be typographical errors that have been missed during proof-reading.

## 2020-01-15 ENCOUNTER — OFFICE VISIT (OUTPATIENT)
Dept: ORTHOPEDICS | Facility: CLINIC | Age: 30
End: 2020-01-15
Attending: ORTHOPAEDIC SURGERY
Payer: COMMERCIAL

## 2020-01-15 VITALS — HEIGHT: 63 IN | BODY MASS INDEX: 21.62 KG/M2 | WEIGHT: 122 LBS

## 2020-01-15 DIAGNOSIS — G56.12 LEFT MEDIAN NERVE NEUROPATHY: Primary | ICD-10-CM

## 2020-01-15 PROCEDURE — 99999 PR PBB SHADOW E&M-EST. PATIENT-LVL II: ICD-10-PCS | Mod: PBBFAC,,, | Performed by: ORTHOPAEDIC SURGERY

## 2020-01-15 PROCEDURE — 99999 PR PBB SHADOW E&M-EST. PATIENT-LVL II: CPT | Mod: PBBFAC,,, | Performed by: ORTHOPAEDIC SURGERY

## 2020-01-15 PROCEDURE — 99213 OFFICE O/P EST LOW 20 MIN: CPT | Mod: S$GLB,,, | Performed by: ORTHOPAEDIC SURGERY

## 2020-01-15 PROCEDURE — 99213 PR OFFICE/OUTPT VISIT, EST, LEVL III, 20-29 MIN: ICD-10-PCS | Mod: S$GLB,,, | Performed by: ORTHOPAEDIC SURGERY

## 2020-01-15 PROCEDURE — 3008F BODY MASS INDEX DOCD: CPT | Mod: CPTII,S$GLB,, | Performed by: ORTHOPAEDIC SURGERY

## 2020-01-15 PROCEDURE — 3008F PR BODY MASS INDEX (BMI) DOCUMENTED: ICD-10-PCS | Mod: CPTII,S$GLB,, | Performed by: ORTHOPAEDIC SURGERY

## 2020-01-15 NOTE — PROGRESS NOTES
"Subjective:      Patient ID: Hailey Mustafa is a 29 y.o. female.  Chief Complaint: Follow-up of the Left Hand      HPI  Hailey Mustafa is a  29 y.o. female presenting today for follow up of left hand symptoms.  She reports that she is doing better since the injection last visit  Previous nerve study showed left carpal tunnel syndrome.  She also has loss of thumb FPL function due to old injury.    Review of patient's allergies indicates:   Allergen Reactions    Ginger     Venom-honey bee          Current Outpatient Medications   Medication Sig Dispense Refill    duloxetine (CYMBALTA) 30 MG capsule Take 30 mg by mouth once daily.      gabapentin (NEURONTIN) 300 MG capsule TAKE 1 CAPSULE BY MOUTH EVERY EVENING (Patient not taking: Reported on 11/27/2019) 30 capsule 3    naproxen (NAPROSYN) 250 MG tablet Take 250 mg by mouth 2 (two) times daily.       No current facility-administered medications for this visit.        No past medical history on file.    No past surgical history on file.    OBJECTIVE:   PHYSICAL EXAM:  Height: 5' 3" (160 cm) Weight: 55.3 kg (122 lb)  Vitals:    01/15/20 1447   Weight: 55.3 kg (122 lb)   Height: 5' 3" (1.6 m)   PainSc: 0-No pain     Ortho/SPM Exam  Examination left hand demonstrate negative Tinel sign full range of motion fingers no FPL left thumb  And thenar atrophy noted      RADIOGRAPHS:  None  Comments: I have personally reviewed the imaging and I agree with the above radiologist's report.    ASSESSMENT/PLAN:     IMPRESSION:  1.  Left carpal tunnel syndrome.  2.  Lack of FPL function left thumb    PLAN:  We discussed options for treatment  The patient might like to consider left carpal tunnel release surgery and possibly consider tendon transfer surgery in the future  She would like to check her schedule and call back based on timing and deductible in the meantime continue splinting at night and regular activities    FOLLOW UP:  As needed    Disclaimer: This " note has been generated using voice-recognition software. There may be typographical errors that have been missed during proof-reading.

## 2021-01-08 ENCOUNTER — OFFICE VISIT (OUTPATIENT)
Dept: OBSTETRICS AND GYNECOLOGY | Facility: CLINIC | Age: 31
End: 2021-01-08
Payer: COMMERCIAL

## 2021-01-08 VITALS
DIASTOLIC BLOOD PRESSURE: 62 MMHG | BODY MASS INDEX: 21.56 KG/M2 | WEIGHT: 121.69 LBS | SYSTOLIC BLOOD PRESSURE: 104 MMHG

## 2021-01-08 DIAGNOSIS — Z01.419 WOMEN'S ANNUAL ROUTINE GYNECOLOGICAL EXAMINATION: Primary | ICD-10-CM

## 2021-01-08 DIAGNOSIS — Z97.5 IUD (INTRAUTERINE DEVICE) IN PLACE: ICD-10-CM

## 2021-01-08 PROCEDURE — 3008F PR BODY MASS INDEX (BMI) DOCUMENTED: ICD-10-PCS | Mod: CPTII,S$GLB,, | Performed by: NURSE PRACTITIONER

## 2021-01-08 PROCEDURE — 99999 PR PBB SHADOW E&M-EST. PATIENT-LVL II: CPT | Mod: PBBFAC,,, | Performed by: NURSE PRACTITIONER

## 2021-01-08 PROCEDURE — 3008F BODY MASS INDEX DOCD: CPT | Mod: CPTII,S$GLB,, | Performed by: NURSE PRACTITIONER

## 2021-01-08 PROCEDURE — 99999 PR PBB SHADOW E&M-EST. PATIENT-LVL II: ICD-10-PCS | Mod: PBBFAC,,, | Performed by: NURSE PRACTITIONER

## 2021-01-08 PROCEDURE — 99395 PR PREVENTIVE VISIT,EST,18-39: ICD-10-PCS | Mod: S$GLB,,, | Performed by: NURSE PRACTITIONER

## 2021-01-08 PROCEDURE — 1126F AMNT PAIN NOTED NONE PRSNT: CPT | Mod: S$GLB,,, | Performed by: NURSE PRACTITIONER

## 2021-01-08 PROCEDURE — 99395 PREV VISIT EST AGE 18-39: CPT | Mod: S$GLB,,, | Performed by: NURSE PRACTITIONER

## 2021-01-08 PROCEDURE — 1126F PR PAIN SEVERITY QUANTIFIED, NO PAIN PRESENT: ICD-10-PCS | Mod: S$GLB,,, | Performed by: NURSE PRACTITIONER

## 2021-04-02 ENCOUNTER — IMMUNIZATION (OUTPATIENT)
Dept: INTERNAL MEDICINE | Facility: CLINIC | Age: 31
End: 2021-04-02
Payer: COMMERCIAL

## 2021-04-02 DIAGNOSIS — Z23 NEED FOR VACCINATION: Primary | ICD-10-CM

## 2021-04-02 PROCEDURE — 0011A COVID-19, MRNA, LNP-S, PF, 100 MCG/0.5 ML DOSE VACCINE: CPT | Mod: PBBFAC | Performed by: INTERNAL MEDICINE

## 2021-04-22 ENCOUNTER — OFFICE VISIT (OUTPATIENT)
Dept: INTERNAL MEDICINE | Facility: CLINIC | Age: 31
End: 2021-04-22
Payer: COMMERCIAL

## 2021-04-22 ENCOUNTER — PATIENT MESSAGE (OUTPATIENT)
Dept: INTERNAL MEDICINE | Facility: CLINIC | Age: 31
End: 2021-04-22

## 2021-04-22 ENCOUNTER — TELEPHONE (OUTPATIENT)
Dept: INTERNAL MEDICINE | Facility: CLINIC | Age: 31
End: 2021-04-22

## 2021-04-22 DIAGNOSIS — F41.9 ANXIETY: ICD-10-CM

## 2021-04-22 DIAGNOSIS — Z76.89 ENCOUNTER TO ESTABLISH CARE WITH NEW DOCTOR: Primary | ICD-10-CM

## 2021-04-22 DIAGNOSIS — Z91.030 BEE STING ALLERGY: ICD-10-CM

## 2021-04-22 PROCEDURE — 99203 PR OFFICE/OUTPT VISIT, NEW, LEVL III, 30-44 MIN: ICD-10-PCS | Mod: 95,,, | Performed by: FAMILY MEDICINE

## 2021-04-22 PROCEDURE — 99203 OFFICE O/P NEW LOW 30 MIN: CPT | Mod: 95,,, | Performed by: FAMILY MEDICINE

## 2021-04-22 RX ORDER — SERTRALINE HYDROCHLORIDE 50 MG/1
TABLET, FILM COATED ORAL
Qty: 30 TABLET | Refills: 5 | Status: SHIPPED | OUTPATIENT
Start: 2021-04-22 | End: 2021-11-17 | Stop reason: SDUPTHER

## 2021-04-22 RX ORDER — EPINEPHRINE 0.3 MG/.3ML
1 INJECTION SUBCUTANEOUS ONCE AS NEEDED
Qty: 1 DEVICE | Refills: 5 | Status: SHIPPED | OUTPATIENT
Start: 2021-04-22 | End: 2021-04-22

## 2021-04-30 ENCOUNTER — IMMUNIZATION (OUTPATIENT)
Dept: INTERNAL MEDICINE | Facility: CLINIC | Age: 31
End: 2021-04-30
Payer: COMMERCIAL

## 2021-04-30 DIAGNOSIS — Z23 NEED FOR VACCINATION: Primary | ICD-10-CM

## 2021-04-30 PROCEDURE — 0012A COVID-19, MRNA, LNP-S, PF, 100 MCG/0.5 ML DOSE VACCINE: CPT | Mod: PBBFAC | Performed by: INTERNAL MEDICINE

## 2021-05-20 ENCOUNTER — OFFICE VISIT (OUTPATIENT)
Dept: INTERNAL MEDICINE | Facility: CLINIC | Age: 31
End: 2021-05-20
Payer: COMMERCIAL

## 2021-05-20 ENCOUNTER — PATIENT MESSAGE (OUTPATIENT)
Dept: INTERNAL MEDICINE | Facility: CLINIC | Age: 31
End: 2021-05-20

## 2021-05-20 DIAGNOSIS — F41.1 GENERALIZED ANXIETY DISORDER: Primary | ICD-10-CM

## 2021-05-20 PROCEDURE — 99213 PR OFFICE/OUTPT VISIT, EST, LEVL III, 20-29 MIN: ICD-10-PCS | Mod: 95,,, | Performed by: FAMILY MEDICINE

## 2021-05-20 PROCEDURE — 99213 OFFICE O/P EST LOW 20 MIN: CPT | Mod: 95,,, | Performed by: FAMILY MEDICINE

## 2021-09-13 ENCOUNTER — PATIENT MESSAGE (OUTPATIENT)
Dept: INTERNAL MEDICINE | Facility: CLINIC | Age: 31
End: 2021-09-13

## 2021-09-14 ENCOUNTER — PATIENT MESSAGE (OUTPATIENT)
Dept: INTERNAL MEDICINE | Facility: CLINIC | Age: 31
End: 2021-09-14

## 2021-09-14 RX ORDER — BUPROPION HYDROCHLORIDE 150 MG/1
150 TABLET ORAL DAILY
Qty: 30 TABLET | Refills: 5 | Status: SHIPPED | OUTPATIENT
Start: 2021-09-14 | End: 2021-11-17

## 2021-09-21 ENCOUNTER — PATIENT OUTREACH (OUTPATIENT)
Dept: ADMINISTRATIVE | Facility: OTHER | Age: 31
End: 2021-09-21

## 2021-09-22 ENCOUNTER — OFFICE VISIT (OUTPATIENT)
Dept: OBSTETRICS AND GYNECOLOGY | Facility: CLINIC | Age: 31
End: 2021-09-22
Payer: COMMERCIAL

## 2021-09-22 ENCOUNTER — PATIENT MESSAGE (OUTPATIENT)
Dept: INTERNAL MEDICINE | Facility: CLINIC | Age: 31
End: 2021-09-22

## 2021-09-22 VITALS
BODY MASS INDEX: 23.28 KG/M2 | DIASTOLIC BLOOD PRESSURE: 62 MMHG | WEIGHT: 131.38 LBS | HEIGHT: 63 IN | SYSTOLIC BLOOD PRESSURE: 128 MMHG

## 2021-09-22 DIAGNOSIS — N93.0 POSTCOITAL BLEEDING: ICD-10-CM

## 2021-09-22 DIAGNOSIS — T83.32XA MALPOSITIONED INTRAUTERINE DEVICE (IUD), INITIAL ENCOUNTER: ICD-10-CM

## 2021-09-22 DIAGNOSIS — Z97.5 CONTRACEPTION, DEVICE INTRAUTERINE: Primary | ICD-10-CM

## 2021-09-22 DIAGNOSIS — Z30.431 IUD CHECK UP: ICD-10-CM

## 2021-09-22 PROCEDURE — 87491 CHLMYD TRACH DNA AMP PROBE: CPT | Performed by: OBSTETRICS & GYNECOLOGY

## 2021-09-22 PROCEDURE — 3074F PR MOST RECENT SYSTOLIC BLOOD PRESSURE < 130 MM HG: ICD-10-PCS | Mod: CPTII,S$GLB,, | Performed by: OBSTETRICS & GYNECOLOGY

## 2021-09-22 PROCEDURE — 1159F MED LIST DOCD IN RCRD: CPT | Mod: CPTII,S$GLB,, | Performed by: OBSTETRICS & GYNECOLOGY

## 2021-09-22 PROCEDURE — 3074F SYST BP LT 130 MM HG: CPT | Mod: CPTII,S$GLB,, | Performed by: OBSTETRICS & GYNECOLOGY

## 2021-09-22 PROCEDURE — 87481 CANDIDA DNA AMP PROBE: CPT | Mod: 59 | Performed by: OBSTETRICS & GYNECOLOGY

## 2021-09-22 PROCEDURE — 99213 PR OFFICE/OUTPT VISIT, EST, LEVL III, 20-29 MIN: ICD-10-PCS | Mod: S$GLB,,, | Performed by: OBSTETRICS & GYNECOLOGY

## 2021-09-22 PROCEDURE — 1159F PR MEDICATION LIST DOCUMENTED IN MEDICAL RECORD: ICD-10-PCS | Mod: CPTII,S$GLB,, | Performed by: OBSTETRICS & GYNECOLOGY

## 2021-09-22 PROCEDURE — 1160F PR REVIEW ALL MEDS BY PRESCRIBER/CLIN PHARMACIST DOCUMENTED: ICD-10-PCS | Mod: CPTII,S$GLB,, | Performed by: OBSTETRICS & GYNECOLOGY

## 2021-09-22 PROCEDURE — 99999 PR PBB SHADOW E&M-EST. PATIENT-LVL III: CPT | Mod: PBBFAC,,, | Performed by: OBSTETRICS & GYNECOLOGY

## 2021-09-22 PROCEDURE — 99999 PR PBB SHADOW E&M-EST. PATIENT-LVL III: ICD-10-PCS | Mod: PBBFAC,,, | Performed by: OBSTETRICS & GYNECOLOGY

## 2021-09-22 PROCEDURE — 3008F BODY MASS INDEX DOCD: CPT | Mod: CPTII,S$GLB,, | Performed by: OBSTETRICS & GYNECOLOGY

## 2021-09-22 PROCEDURE — 87591 N.GONORRHOEAE DNA AMP PROB: CPT | Mod: 59 | Performed by: OBSTETRICS & GYNECOLOGY

## 2021-09-22 PROCEDURE — 99213 OFFICE O/P EST LOW 20 MIN: CPT | Mod: S$GLB,,, | Performed by: OBSTETRICS & GYNECOLOGY

## 2021-09-22 PROCEDURE — 3078F PR MOST RECENT DIASTOLIC BLOOD PRESSURE < 80 MM HG: ICD-10-PCS | Mod: CPTII,S$GLB,, | Performed by: OBSTETRICS & GYNECOLOGY

## 2021-09-22 PROCEDURE — 1160F RVW MEDS BY RX/DR IN RCRD: CPT | Mod: CPTII,S$GLB,, | Performed by: OBSTETRICS & GYNECOLOGY

## 2021-09-22 PROCEDURE — 3078F DIAST BP <80 MM HG: CPT | Mod: CPTII,S$GLB,, | Performed by: OBSTETRICS & GYNECOLOGY

## 2021-09-22 PROCEDURE — 3008F PR BODY MASS INDEX (BMI) DOCUMENTED: ICD-10-PCS | Mod: CPTII,S$GLB,, | Performed by: OBSTETRICS & GYNECOLOGY

## 2021-09-23 LAB
C TRACH DNA SPEC QL NAA+PROBE: NOT DETECTED
N GONORRHOEA DNA SPEC QL NAA+PROBE: NOT DETECTED

## 2021-09-29 ENCOUNTER — PATIENT MESSAGE (OUTPATIENT)
Dept: OBSTETRICS AND GYNECOLOGY | Facility: CLINIC | Age: 31
End: 2021-09-29

## 2021-09-29 LAB
BACTERIAL VAGINOSIS DNA: NEGATIVE
CANDIDA GLABRATA DNA: NEGATIVE
CANDIDA KRUSEI DNA: NEGATIVE
CANDIDA RRNA VAG QL PROBE: NEGATIVE
T VAGINALIS RRNA GENITAL QL PROBE: NEGATIVE

## 2021-11-17 ENCOUNTER — PATIENT MESSAGE (OUTPATIENT)
Dept: OBSTETRICS AND GYNECOLOGY | Facility: CLINIC | Age: 31
End: 2021-11-17
Payer: COMMERCIAL

## 2021-11-17 ENCOUNTER — PATIENT MESSAGE (OUTPATIENT)
Dept: INTERNAL MEDICINE | Facility: CLINIC | Age: 31
End: 2021-11-17
Payer: COMMERCIAL

## 2021-11-17 DIAGNOSIS — F41.9 ANXIETY: ICD-10-CM

## 2021-11-17 RX ORDER — SERTRALINE HYDROCHLORIDE 50 MG/1
TABLET, FILM COATED ORAL
Qty: 30 TABLET | Refills: 5 | Status: SHIPPED | OUTPATIENT
Start: 2021-11-17 | End: 2022-08-25 | Stop reason: SDUPTHER

## 2021-11-18 ENCOUNTER — PATIENT MESSAGE (OUTPATIENT)
Dept: INTERNAL MEDICINE | Facility: CLINIC | Age: 31
End: 2021-11-18
Payer: COMMERCIAL

## 2021-12-17 ENCOUNTER — OFFICE VISIT (OUTPATIENT)
Dept: INTERNAL MEDICINE | Facility: CLINIC | Age: 31
End: 2021-12-17
Payer: COMMERCIAL

## 2021-12-17 DIAGNOSIS — F41.1 GENERALIZED ANXIETY DISORDER: ICD-10-CM

## 2021-12-17 PROCEDURE — 99213 PR OFFICE/OUTPT VISIT, EST, LEVL III, 20-29 MIN: ICD-10-PCS | Mod: 95,,, | Performed by: FAMILY MEDICINE

## 2021-12-17 PROCEDURE — 99213 OFFICE O/P EST LOW 20 MIN: CPT | Mod: 95,,, | Performed by: FAMILY MEDICINE

## 2021-12-20 ENCOUNTER — PATIENT MESSAGE (OUTPATIENT)
Dept: OBSTETRICS AND GYNECOLOGY | Facility: CLINIC | Age: 31
End: 2021-12-20
Payer: COMMERCIAL

## 2022-01-09 ENCOUNTER — PATIENT OUTREACH (OUTPATIENT)
Dept: ADMINISTRATIVE | Facility: OTHER | Age: 32
End: 2022-01-09
Payer: COMMERCIAL

## 2022-01-10 ENCOUNTER — OFFICE VISIT (OUTPATIENT)
Dept: OBSTETRICS AND GYNECOLOGY | Facility: CLINIC | Age: 32
End: 2022-01-10
Payer: COMMERCIAL

## 2022-01-10 ENCOUNTER — LAB VISIT (OUTPATIENT)
Dept: LAB | Facility: OTHER | Age: 32
End: 2022-01-10
Attending: OBSTETRICS & GYNECOLOGY
Payer: COMMERCIAL

## 2022-01-10 VITALS
BODY MASS INDEX: 23.75 KG/M2 | WEIGHT: 134.06 LBS | SYSTOLIC BLOOD PRESSURE: 102 MMHG | HEIGHT: 63 IN | DIASTOLIC BLOOD PRESSURE: 60 MMHG

## 2022-01-10 DIAGNOSIS — A60.9 ANOGENITAL HSV INFECTION: ICD-10-CM

## 2022-01-10 DIAGNOSIS — A60.9 ANOGENITAL HSV INFECTION: Primary | ICD-10-CM

## 2022-01-10 PROCEDURE — 1159F PR MEDICATION LIST DOCUMENTED IN MEDICAL RECORD: ICD-10-PCS | Mod: CPTII,S$GLB,, | Performed by: OBSTETRICS & GYNECOLOGY

## 2022-01-10 PROCEDURE — 36415 COLL VENOUS BLD VENIPUNCTURE: CPT | Performed by: OBSTETRICS & GYNECOLOGY

## 2022-01-10 PROCEDURE — 1159F MED LIST DOCD IN RCRD: CPT | Mod: CPTII,S$GLB,, | Performed by: OBSTETRICS & GYNECOLOGY

## 2022-01-10 PROCEDURE — 3008F PR BODY MASS INDEX (BMI) DOCUMENTED: ICD-10-PCS | Mod: CPTII,S$GLB,, | Performed by: OBSTETRICS & GYNECOLOGY

## 2022-01-10 PROCEDURE — 3074F PR MOST RECENT SYSTOLIC BLOOD PRESSURE < 130 MM HG: ICD-10-PCS | Mod: CPTII,S$GLB,, | Performed by: OBSTETRICS & GYNECOLOGY

## 2022-01-10 PROCEDURE — 3008F BODY MASS INDEX DOCD: CPT | Mod: CPTII,S$GLB,, | Performed by: OBSTETRICS & GYNECOLOGY

## 2022-01-10 PROCEDURE — 86695 HERPES SIMPLEX TYPE 1 TEST: CPT | Performed by: OBSTETRICS & GYNECOLOGY

## 2022-01-10 PROCEDURE — 1160F PR REVIEW ALL MEDS BY PRESCRIBER/CLIN PHARMACIST DOCUMENTED: ICD-10-PCS | Mod: CPTII,S$GLB,, | Performed by: OBSTETRICS & GYNECOLOGY

## 2022-01-10 PROCEDURE — 99213 OFFICE O/P EST LOW 20 MIN: CPT | Mod: S$GLB,,, | Performed by: OBSTETRICS & GYNECOLOGY

## 2022-01-10 PROCEDURE — 1160F RVW MEDS BY RX/DR IN RCRD: CPT | Mod: CPTII,S$GLB,, | Performed by: OBSTETRICS & GYNECOLOGY

## 2022-01-10 PROCEDURE — 99999 PR PBB SHADOW E&M-EST. PATIENT-LVL II: ICD-10-PCS | Mod: PBBFAC,,, | Performed by: OBSTETRICS & GYNECOLOGY

## 2022-01-10 PROCEDURE — 3074F SYST BP LT 130 MM HG: CPT | Mod: CPTII,S$GLB,, | Performed by: OBSTETRICS & GYNECOLOGY

## 2022-01-10 PROCEDURE — 86696 HERPES SIMPLEX TYPE 2 TEST: CPT | Performed by: OBSTETRICS & GYNECOLOGY

## 2022-01-10 PROCEDURE — 99999 PR PBB SHADOW E&M-EST. PATIENT-LVL II: CPT | Mod: PBBFAC,,, | Performed by: OBSTETRICS & GYNECOLOGY

## 2022-01-10 PROCEDURE — 3078F PR MOST RECENT DIASTOLIC BLOOD PRESSURE < 80 MM HG: ICD-10-PCS | Mod: CPTII,S$GLB,, | Performed by: OBSTETRICS & GYNECOLOGY

## 2022-01-10 PROCEDURE — 99213 PR OFFICE/OUTPT VISIT, EST, LEVL III, 20-29 MIN: ICD-10-PCS | Mod: S$GLB,,, | Performed by: OBSTETRICS & GYNECOLOGY

## 2022-01-10 PROCEDURE — 3078F DIAST BP <80 MM HG: CPT | Mod: CPTII,S$GLB,, | Performed by: OBSTETRICS & GYNECOLOGY

## 2022-01-10 NOTE — PROGRESS NOTES
Care Everywhere: updated  Immunization: updated  Health Maintenance: updated  Media Review:   Legacy Review:   DIS:  Order placed:   Upcoming appts:  EFAX:  Task Tickets:  Referrals:

## 2022-01-10 NOTE — PROGRESS NOTES
Chief Complaint   Patient presents with    Follow-up     Pt says that she recently went to Urgent Care b/c she said she has sx's of cottage cheese like d/c, odor, irritation and burning when urinating. Pt says she tested + for yeast infection and UTI. Pt says she also had a HSV test done b/c she discovered bumps on the inside of her vaginal opening and would like to be examined. Pt says she no longer has the bumps and was prescribe an antiviral medication. Pt says her HSV results have not come back yet.        HPI:   31 y.o.  here today for concern for possible HSV infection. About a month ago started having painful bumps between the bottom of the vagina and the anal region, initially thought it was from razor burn because she shaves this area. Bumps were painful. They have since resolved. She had negative STD screening at urgent care and is now concerned it may be HSV, and was treated empirically with 10 days of acyclovir. She has never had HSV serologies before and denies previous outbreaks or similar bumps. She denies current abnormal vaginal discharge, itching, irritation, dysuria, frequency, or other complaints. She was treated for a yeast infection and UTI and reports those symptoms have resolved. She has had two new sexual partners in the past few months. IUD for contraception.     LMP Dates from Last 1 Encounters:   LMP: 2019       Labs / Significant Studies  Pap (10/2019): NILM    No visits with results within 3 Month(s) from this visit.   Latest known visit with results is:   Office Visit on 2021   Component Date Value Ref Range Status    Trichomonas vaginalis 2021 Negative  Negative Final    Candida sp 2021 Negative  Negative Final    Comment: Claudine species group includes: Candida albicans, Candida tropicalis,  Candida parapsiolosis, Claudine dubliniensis      Claudine glabrata DNA 2021 Negative  Negative Final    Claudine krusei DNA 2021 Negative  Negative  Final    Bacterial vaginosis DNA 2021 Negative  Negative Final    Chlamydia, Amplified DNA 2021 Not Detected  Not Detected Final    N gonorrhoeae, amplified DNA 2021 Not Detected  Not Detected Final        Past Medical History:   Diagnosis Date    Anxiety      Past Surgical History:   Procedure Laterality Date    WISDOM TOOTH EXTRACTION         Current Outpatient Medications:     levonorgestreL (MIRENA) 20 mcg/24 hours (6 yrs) 52 mg IUD, 1 each by Intrauterine route once., Disp: , Rfl:     sertraline (ZOLOFT) 50 MG tablet, Take 0.5 tablets (25 mg total) by mouth once daily for 14 days, THEN 1 tablet (50 mg total) once daily., Disp: 30 tablet, Rfl: 5    EPINEPHrine (EPIPEN) 0.3 mg/0.3 mL AtIn, Inject 0.3 mLs (0.3 mg total) into the muscle once as needed (allergic reaction)., Disp: 1 Device, Rfl: 5  Review of patient's allergies indicates:   Allergen Reactions    Ginger     Venom-honey bee      OB History    Para Term  AB Living   0 0 0 0 0 0   SAB IAB Ectopic Multiple Live Births   0 0 0 0 0     Social History     Tobacco Use    Smoking status: Former Smoker    Smokeless tobacco: Never Used   Substance Use Topics    Alcohol use: Yes    Drug use: Never     Family History   Problem Relation Age of Onset    Pancreatic cancer Father     No Known Problems Mother     No Known Problems Brother     Heart attack Maternal Grandmother     Asbestos Maternal Grandfather     Pancreatic cancer Paternal Grandfather        Review of Systems   Negative except as in HPI      Physical Exam   Vitals:    01/10/22 1438   BP: 102/60     Body mass index is 23.74 kg/m².    Physical Exam  Constitutional:       General: She is not in acute distress.     Appearance: Normal appearance.   Genitourinary:      Vulva, urethra, bladder, uterus, right adnexa and left adnexa normal.      Vulva exam comments: No herpetic lesions or other lesions seen .      No vaginal discharge or bleeding.         Right Adnexa: not tender, not full and no mass present.     Left Adnexa: not tender, not full and no mass present.     No cervical motion tenderness, lesion or bleeding.      Uterus is mobile.      Uterus is not tender.      No uterine mass detected.     Uterus is anteverted and regular.      Bladder is not distended and is not tender.       Pelvic exam was performed with patient in the lithotomy position.   HENT:      Head: Normocephalic and atraumatic.   Pulmonary:      Effort: Pulmonary effort is normal.   Abdominal:      General: Bowel sounds are normal. There is no distension.      Palpations: Abdomen is soft.      Tenderness: There is no abdominal tenderness. There is no guarding or rebound.   Musculoskeletal:      Cervical back: Normal range of motion.   Neurological:      General: No focal deficit present.      Mental Status: She is alert and oriented to person, place, and time.   Skin:     General: Skin is warm and dry.   Psychiatric:         Mood and Affect: Mood normal.         Behavior: Behavior normal.         Thought Content: Thought content normal.   Exam conducted with a chaperone present.        Labs reviewed: GC/CT, vaginosis screen, pap    ASSESSMENT:   Patient Active Problem List   Diagnosis    Left median nerve neuropathy    IUD (Mirena) in place- exp 11/23    Generalized anxiety disorder    Anogenital HSV infection       PLAN:  Problem List Items Addressed This Visit        ID    Anogenital HSV infection - Primary    Current Assessment & Plan     No active lesions seen today, however patient concerned for possible HSV infection based on new sexual partners and painful bumps that first appeared about a month ago but have since resolved. Will order HSV serologies. If patient has evidence of IgM or IgG for HSV, will consider HSV ppx as patient wants to reduce the possibility for asymptomatic viral shedding as well as recurrence of out breaks. Declines further STD screening today as she  recently had this done at  and all tests were negative. No other symptoms today. RTC 10/2022 for annual exam, sooner PRN.          Relevant Orders    HSV 1 & 2, IgM    Herpes simplex type 1&2 IgG,Herpes titer           Total time spent on this encounter was 20 minutes.  This includes preparing to see the patient;  obtaining/reviewing separately obtained history;  performing a medical exam and/or evaluation;   counseling/educating the patient;  ordering medications, tests, of procedures;   referring/communicating with other health care professionals;  EMR documentation;  interpreting/communicating results to the patient;  and/or care coordination.    Follow up in about 9 months (around 10/1/2022) for Annual.       Amanda Gerard MD  Department of Obstetrics & Gynecology  [facility]

## 2022-01-11 ENCOUNTER — TELEPHONE (OUTPATIENT)
Dept: OBSTETRICS AND GYNECOLOGY | Facility: CLINIC | Age: 32
End: 2022-01-11
Payer: COMMERCIAL

## 2022-01-11 PROBLEM — A60.9 ANOGENITAL HSV INFECTION: Status: ACTIVE | Noted: 2022-01-11

## 2022-01-11 NOTE — ASSESSMENT & PLAN NOTE
No active lesions seen today, however patient concerned for possible HSV infection based on new sexual partners and painful bumps that first appeared about a month ago but have since resolved. Will order HSV serologies. If patient has evidence of IgM or IgG for HSV, will consider HSV ppx as patient wants to reduce the possibility for asymptomatic viral shedding as well as recurrence of out breaks. Declines further STD screening today as she recently had this done at  and all tests were negative. No other symptoms today. RTC 10/2022 for annual exam, sooner PRN.

## 2022-01-12 LAB
HSV1 IGG SERPL QL IA: POSITIVE
HSV2 IGG SERPL QL IA: POSITIVE

## 2022-01-15 ENCOUNTER — PATIENT MESSAGE (OUTPATIENT)
Dept: OBSTETRICS AND GYNECOLOGY | Facility: CLINIC | Age: 32
End: 2022-01-15
Payer: COMMERCIAL

## 2022-01-15 DIAGNOSIS — A60.9 HSV (HERPES SIMPLEX VIRUS) ANOGENITAL INFECTION: Primary | ICD-10-CM

## 2022-01-18 RX ORDER — VALACYCLOVIR HYDROCHLORIDE 500 MG/1
500 TABLET, FILM COATED ORAL DAILY
Qty: 30 TABLET | Refills: 11 | Status: SHIPPED | OUTPATIENT
Start: 2022-01-18 | End: 2022-02-15 | Stop reason: SDUPTHER

## 2022-02-15 ENCOUNTER — PATIENT MESSAGE (OUTPATIENT)
Dept: OBSTETRICS AND GYNECOLOGY | Facility: CLINIC | Age: 32
End: 2022-02-15
Payer: COMMERCIAL

## 2022-02-15 DIAGNOSIS — A60.9 ANOGENITAL HSV INFECTION: Primary | ICD-10-CM

## 2022-02-15 DIAGNOSIS — A60.9 HSV (HERPES SIMPLEX VIRUS) ANOGENITAL INFECTION: ICD-10-CM

## 2022-02-15 RX ORDER — VALACYCLOVIR HYDROCHLORIDE 500 MG/1
500 TABLET, FILM COATED ORAL DAILY
Qty: 30 TABLET | Refills: 11 | Status: SHIPPED | OUTPATIENT
Start: 2022-02-15 | End: 2023-02-20

## 2022-05-18 ENCOUNTER — IMMUNIZATION (OUTPATIENT)
Dept: INTERNAL MEDICINE | Facility: CLINIC | Age: 32
End: 2022-05-18
Payer: COMMERCIAL

## 2022-05-18 DIAGNOSIS — Z23 NEED FOR VACCINATION: Primary | ICD-10-CM

## 2022-05-18 PROCEDURE — 91305 COVID-19, MRNA, LNP-S, PF, 30 MCG/0.3 ML DOSE VACCINE (PFIZER): CPT | Mod: PBBFAC | Performed by: INTERNAL MEDICINE

## 2022-06-27 ENCOUNTER — TELEPHONE (OUTPATIENT)
Dept: OBSTETRICS AND GYNECOLOGY | Facility: CLINIC | Age: 32
End: 2022-06-27
Payer: COMMERCIAL

## 2022-06-27 DIAGNOSIS — Z30.014 ENCOUNTER FOR INITIAL PRESCRIPTION OF INTRAUTERINE CONTRACEPTIVE DEVICE (IUD): Primary | ICD-10-CM

## 2022-07-22 ENCOUNTER — PROCEDURE VISIT (OUTPATIENT)
Dept: OBSTETRICS AND GYNECOLOGY | Facility: CLINIC | Age: 32
End: 2022-07-22
Payer: COMMERCIAL

## 2022-07-22 DIAGNOSIS — Z30.433 ENCOUNTER FOR IUD REMOVAL AND REINSERTION: Primary | ICD-10-CM

## 2022-07-22 DIAGNOSIS — Z30.430 ENCOUNTER FOR INSERTION OF INTRAUTERINE CONTRACEPTIVE DEVICE (IUD): ICD-10-CM

## 2022-07-22 LAB
B-HCG UR QL: NEGATIVE
CTP QC/QA: YES

## 2022-07-22 PROCEDURE — 58301 REMOVE INTRAUTERINE DEVICE: CPT | Mod: S$GLB,,, | Performed by: OBSTETRICS & GYNECOLOGY

## 2022-07-22 PROCEDURE — 58300 INSERT INTRAUTERINE DEVICE: CPT | Mod: S$GLB,,, | Performed by: OBSTETRICS & GYNECOLOGY

## 2022-07-22 PROCEDURE — 81025 POCT URINE PREGNANCY: ICD-10-PCS | Mod: S$GLB,,, | Performed by: OBSTETRICS & GYNECOLOGY

## 2022-07-22 PROCEDURE — 58301 REMOVAL OF IUD: ICD-10-PCS | Mod: S$GLB,,, | Performed by: OBSTETRICS & GYNECOLOGY

## 2022-07-22 PROCEDURE — 81025 URINE PREGNANCY TEST: CPT | Mod: S$GLB,,, | Performed by: OBSTETRICS & GYNECOLOGY

## 2022-07-22 PROCEDURE — 58300 INSERTION OF IUD: ICD-10-PCS | Mod: S$GLB,,, | Performed by: OBSTETRICS & GYNECOLOGY

## 2022-07-26 PROBLEM — Z30.433 ENCOUNTER FOR IUD REMOVAL AND REINSERTION: Status: ACTIVE | Noted: 2022-07-26

## 2022-07-26 NOTE — PROCEDURES
Removal of IUD    Date/Time: 7/22/2022 2:45 PM  Performed by: Amanda Gerard MD  Authorized by: Amanda Gerard MD     Consent obtained:  Written  Consent given by:  Patient  Procedure risks and benefits discussed: yes    Patient questions answered: yes    Patient agrees, verbalizes understanding, and wants to proceed: yes    IUD grasped by: IUD hook  Removal due to infection and inflammatory reaction: no    Removal due to mechanical complications of IUD/Nexplanon: no    Removed with no complications: yes    Insertion of IUD    Date/Time: 7/22/2022 2:45 PM  Performed by: Amanda Gerard MD  Authorized by: Amanda Gerard MD     Consent:     Consent obtained:  Written    Consent given by:  Patient    Procedure risks and benefits discussed: yes      Patient questions answered: yes      Patient agrees, verbalizes understanding, and wants to proceed: yes      Educational handouts given: yes      Instructions and paperwork completed: yes    Procedure:     Pelvic exam performed: yes      Negative urine pregnancy test: yes      Cervix cleaned and prepped: yes      Speculum placed in vagina: yes      Uterus sounded: yes      Uterus sound depth (cm):  8    IUD inserted with no complications: yes      IUD type:  Mirena    Strings trimmed: yes    1 Intra Uterine Device levonorgestreL 20 mcg/24 hours (7 yrs) 52 mg       Post-procedure:     Patient tolerated procedure well: yes    Comments:      Patient to follow up in 6 weeks for string check.         Patient was counseled of risks to procedure including but not limited to pain, bleeding, infection, IUD perforation requiring abdominal surgery for removal, explusion, migration, need for hysteroscopic removal, pregnancy and risk of ectopic. Patient consents and signed Mirena form.    Counseling lasted approximately 15 minutes and all her questions were answered.    Bimanual exam confirmed uterine position.  Vaginal speculum inserted. Strings not immediately visible. Unable  to retrieve them with cytobrush, but able to pull down with IUD hook. Removed intact. Cervix prepped with hibiclens.  Cervix grasped with tenaculum at 12 o'clock.  Mirena device fitted to sounded depth and inserted without difficulty.   IUD strings cut 3 cm from cervical os.  Tenaculum removed.  Tenaculum site hemostatic with pressure. All instruments removed from patient's vagina and she tolerated the procedure well. RTC 6 weeks for string check.        Amanda Gerard MD  Department of Obstetrics & Gynecology  [facility]

## 2022-07-26 NOTE — PROCEDURES
Removal of IUD    Date/Time: 7/22/2022 2:45 PM  Performed by: Amanda Gerard MD  Authorized by: Amanda Gerard MD     Consent obtained:  Written  Consent given by:  Patient  Procedure risks and benefits discussed: yes    Patient questions answered: yes    Patient agrees, verbalizes understanding, and wants to proceed: yes

## 2022-08-25 ENCOUNTER — OFFICE VISIT (OUTPATIENT)
Dept: INTERNAL MEDICINE | Facility: CLINIC | Age: 32
End: 2022-08-25
Payer: COMMERCIAL

## 2022-08-25 ENCOUNTER — PATIENT MESSAGE (OUTPATIENT)
Dept: OBSTETRICS AND GYNECOLOGY | Facility: CLINIC | Age: 32
End: 2022-08-25
Payer: COMMERCIAL

## 2022-08-25 DIAGNOSIS — F41.1 GENERALIZED ANXIETY DISORDER: ICD-10-CM

## 2022-08-25 DIAGNOSIS — F41.9 ANXIETY: ICD-10-CM

## 2022-08-25 PROCEDURE — 1159F PR MEDICATION LIST DOCUMENTED IN MEDICAL RECORD: ICD-10-PCS | Mod: CPTII,95,, | Performed by: FAMILY MEDICINE

## 2022-08-25 PROCEDURE — 1159F MED LIST DOCD IN RCRD: CPT | Mod: CPTII,95,, | Performed by: FAMILY MEDICINE

## 2022-08-25 PROCEDURE — 99214 OFFICE O/P EST MOD 30 MIN: CPT | Mod: 95,,, | Performed by: FAMILY MEDICINE

## 2022-08-25 PROCEDURE — 99214 PR OFFICE/OUTPT VISIT, EST, LEVL IV, 30-39 MIN: ICD-10-PCS | Mod: 95,,, | Performed by: FAMILY MEDICINE

## 2022-08-25 PROCEDURE — 1160F PR REVIEW ALL MEDS BY PRESCRIBER/CLIN PHARMACIST DOCUMENTED: ICD-10-PCS | Mod: CPTII,95,, | Performed by: FAMILY MEDICINE

## 2022-08-25 PROCEDURE — 1160F RVW MEDS BY RX/DR IN RCRD: CPT | Mod: CPTII,95,, | Performed by: FAMILY MEDICINE

## 2022-08-25 RX ORDER — SERTRALINE HYDROCHLORIDE 50 MG/1
50 TABLET, FILM COATED ORAL DAILY
Qty: 30 TABLET | Refills: 5 | Status: SHIPPED | OUTPATIENT
Start: 2022-08-25 | End: 2023-06-13

## 2022-08-25 NOTE — PROGRESS NOTES
Subjective:       Patient ID: Hailey Mustafa is a 31 y.o. female.    Chief Complaint: No chief complaint on file.    HPI  The patient location is: [place]  The chief complaint leading to consultation is: med checkup     Visit type: audiovisual     Face to Face time with patient: 10 min  30 minutes of total time spent on the encounter, which includes face to face time and non-face to face time preparing to see the patient (eg, review of tests), Obtaining and/or reviewing separately obtained history, Documenting clinical information in the electronic or other health record, Independently interpreting results (not separately reported) and communicating results to the patient/family/caregiver, or Care coordination (not separately reported).      Each patient to whom he or she provides medical services by telemedicine is:  (1) informed of the relationship between the physician and patient and the respective role of any other health care provider with respect to management of the patient; and (2) notified that he or she may decline to receive medical services by telemedicine and may withdraw from such care at any time.    Hailey Mustafa is a 31 y.o. female for virtual.    Went off zoloft ~5/2022, restarted last month with 1/2 tab.  Worried about weight gain with zoloft but also was in trial at the time that she stopped taking because she needed to be awake more in preparation.  Stress with work.      #Obgyn:  - est w/ Dr. Tijerina,  9/2021  - Mirena IUD    #Psych: [facility]  - reg: Zoloft 50 qd  - est w/ counselor, sees q2wks  - cymbalta in the past, lexapro in the past (drowsiness)  - wellbutrin in past (did not tolerate)    Review of Systems   Constitutional: Negative for activity change and unexpected weight change.   HENT: Negative for hearing loss, rhinorrhea and trouble swallowing.    Eyes: Negative for discharge and visual disturbance.   Respiratory: Negative for chest tightness and wheezing.     Cardiovascular: Negative for chest pain and palpitations.   Gastrointestinal: Negative for blood in stool, constipation, diarrhea and vomiting.   Endocrine: Positive for polydipsia. Negative for polyuria.   Genitourinary: Negative for difficulty urinating, dysuria, hematuria and menstrual problem.   Musculoskeletal: Negative for arthralgias, joint swelling and neck pain.   Neurological: Negative for weakness and headaches.   Psychiatric/Behavioral: Positive for dysphoric mood. Negative for confusion.         Past Medical History:   Diagnosis Date    Anxiety         Prior to Admission medications    Medication Sig Start Date End Date Taking? Authorizing Provider   EPINEPHrine (EPIPEN) 0.3 mg/0.3 mL AtIn Inject 0.3 mLs (0.3 mg total) into the muscle once as needed (allergic reaction). 4/22/21 4/22/21  Edvin Luis MD   levonorgestreL (MIRENA) 20 mcg/24 hours (6 yrs) 52 mg IUD 1 each by Intrauterine route once.    Historical Provider   sertraline (ZOLOFT) 50 MG tablet Take 0.5 tablets (25 mg total) by mouth once daily for 14 days, THEN 1 tablet (50 mg total) once daily. 11/17/21 5/30/22  Edvin Luis MD   valACYclovir (VALTREX) 500 MG tablet Take 1 tablet (500 mg total) by mouth once daily. 2/15/22 3/17/22  Amanda Gerard MD        Past medical history, surgical history, and family medical history reviewed and updated as appropriate.    Medications and allergies reviewed.     Objective:          There were no vitals filed for this visit.  There is no height or weight on file to calculate BMI.  Physical Exam  Constitutional:       General: She is not in acute distress.     Appearance: Normal appearance.   Eyes:      Extraocular Movements: Extraocular movements intact.   Pulmonary:      Effort: Pulmonary effort is normal. No respiratory distress.   Neurological:      Mental Status: She is alert and oriented to person, place, and time.   Psychiatric:         Mood and Affect: Mood normal.         Behavior:  Behavior normal.         No results found for: WBC, HGB, HCT, PLT, CHOL, TRIG, HDL, LDLDIRECT, ALT, AST, NA, K, CL, CREATININE, BUN, CO2, TSH, PSA, INR, GLUF, HGBA1C, MICROALBUR    Assessment:       1. Anxiety    2. Generalized anxiety disorder          Plan:   1. Anxiety  -     sertraline (ZOLOFT) 50 MG tablet    2. Generalized anxiety disorder  Overview:  - titrate back to 50mg zoloft  - consider benzo for prn use (breakthru panic)  - cymbalta in the past, lexapro in the past (drowsiness)  - wellbutrin in past (did not tolerate)        Health maintenance reviewed with patient.     No follow-ups on file.    Edvin Luis MD  Family Medicine / Primary Care  [facility]  8/25/2022

## 2023-02-20 DIAGNOSIS — A60.9 HSV (HERPES SIMPLEX VIRUS) ANOGENITAL INFECTION: Primary | ICD-10-CM

## 2023-04-03 ENCOUNTER — PATIENT MESSAGE (OUTPATIENT)
Dept: ADMINISTRATIVE | Facility: HOSPITAL | Age: 33
End: 2023-04-03
Payer: COMMERCIAL

## 2023-06-13 ENCOUNTER — OFFICE VISIT (OUTPATIENT)
Dept: OBSTETRICS AND GYNECOLOGY | Facility: CLINIC | Age: 33
End: 2023-06-13
Attending: OBSTETRICS & GYNECOLOGY
Payer: COMMERCIAL

## 2023-06-13 VITALS
BODY MASS INDEX: 22.54 KG/M2 | DIASTOLIC BLOOD PRESSURE: 68 MMHG | HEIGHT: 63 IN | SYSTOLIC BLOOD PRESSURE: 120 MMHG | WEIGHT: 127.19 LBS

## 2023-06-13 DIAGNOSIS — N93.0 POSTCOITAL BLEEDING: ICD-10-CM

## 2023-06-13 DIAGNOSIS — Z30.431 ENCOUNTER FOR ROUTINE CHECKING OF INTRAUTERINE CONTRACEPTIVE DEVICE (IUD): ICD-10-CM

## 2023-06-13 DIAGNOSIS — N76.0 ACUTE VAGINITIS: ICD-10-CM

## 2023-06-13 DIAGNOSIS — R30.0 DYSURIA: Primary | ICD-10-CM

## 2023-06-13 LAB
B-HCG UR QL: NEGATIVE
CTP QC/QA: YES

## 2023-06-13 PROCEDURE — 3078F PR MOST RECENT DIASTOLIC BLOOD PRESSURE < 80 MM HG: ICD-10-PCS | Mod: CPTII,S$GLB,, | Performed by: OBSTETRICS & GYNECOLOGY

## 2023-06-13 PROCEDURE — 81514 NFCT DS BV&VAGINITIS DNA ALG: CPT | Performed by: OBSTETRICS & GYNECOLOGY

## 2023-06-13 PROCEDURE — 3078F DIAST BP <80 MM HG: CPT | Mod: CPTII,S$GLB,, | Performed by: OBSTETRICS & GYNECOLOGY

## 2023-06-13 PROCEDURE — 81025 POCT URINE PREGNANCY: ICD-10-PCS | Mod: S$GLB,,, | Performed by: OBSTETRICS & GYNECOLOGY

## 2023-06-13 PROCEDURE — 99213 OFFICE O/P EST LOW 20 MIN: CPT | Mod: S$GLB,,, | Performed by: OBSTETRICS & GYNECOLOGY

## 2023-06-13 PROCEDURE — 3074F SYST BP LT 130 MM HG: CPT | Mod: CPTII,S$GLB,, | Performed by: OBSTETRICS & GYNECOLOGY

## 2023-06-13 PROCEDURE — 87591 N.GONORRHOEAE DNA AMP PROB: CPT | Performed by: OBSTETRICS & GYNECOLOGY

## 2023-06-13 PROCEDURE — 1160F RVW MEDS BY RX/DR IN RCRD: CPT | Mod: CPTII,S$GLB,, | Performed by: OBSTETRICS & GYNECOLOGY

## 2023-06-13 PROCEDURE — 3008F BODY MASS INDEX DOCD: CPT | Mod: CPTII,S$GLB,, | Performed by: OBSTETRICS & GYNECOLOGY

## 2023-06-13 PROCEDURE — 81025 URINE PREGNANCY TEST: CPT | Mod: S$GLB,,, | Performed by: OBSTETRICS & GYNECOLOGY

## 2023-06-13 PROCEDURE — 87088 URINE BACTERIA CULTURE: CPT | Performed by: OBSTETRICS & GYNECOLOGY

## 2023-06-13 PROCEDURE — 87186 SC STD MICRODIL/AGAR DIL: CPT | Performed by: OBSTETRICS & GYNECOLOGY

## 2023-06-13 PROCEDURE — 1159F MED LIST DOCD IN RCRD: CPT | Mod: CPTII,S$GLB,, | Performed by: OBSTETRICS & GYNECOLOGY

## 2023-06-13 PROCEDURE — 87077 CULTURE AEROBIC IDENTIFY: CPT | Performed by: OBSTETRICS & GYNECOLOGY

## 2023-06-13 PROCEDURE — 87086 URINE CULTURE/COLONY COUNT: CPT | Performed by: OBSTETRICS & GYNECOLOGY

## 2023-06-13 PROCEDURE — 1160F PR REVIEW ALL MEDS BY PRESCRIBER/CLIN PHARMACIST DOCUMENTED: ICD-10-PCS | Mod: CPTII,S$GLB,, | Performed by: OBSTETRICS & GYNECOLOGY

## 2023-06-13 PROCEDURE — 99999 PR PBB SHADOW E&M-EST. PATIENT-LVL III: CPT | Mod: PBBFAC,,, | Performed by: OBSTETRICS & GYNECOLOGY

## 2023-06-13 PROCEDURE — 1159F PR MEDICATION LIST DOCUMENTED IN MEDICAL RECORD: ICD-10-PCS | Mod: CPTII,S$GLB,, | Performed by: OBSTETRICS & GYNECOLOGY

## 2023-06-13 PROCEDURE — 99999 PR PBB SHADOW E&M-EST. PATIENT-LVL III: ICD-10-PCS | Mod: PBBFAC,,, | Performed by: OBSTETRICS & GYNECOLOGY

## 2023-06-13 PROCEDURE — 3008F PR BODY MASS INDEX (BMI) DOCUMENTED: ICD-10-PCS | Mod: CPTII,S$GLB,, | Performed by: OBSTETRICS & GYNECOLOGY

## 2023-06-13 PROCEDURE — 3074F PR MOST RECENT SYSTOLIC BLOOD PRESSURE < 130 MM HG: ICD-10-PCS | Mod: CPTII,S$GLB,, | Performed by: OBSTETRICS & GYNECOLOGY

## 2023-06-13 PROCEDURE — 99213 PR OFFICE/OUTPT VISIT, EST, LEVL III, 20-29 MIN: ICD-10-PCS | Mod: S$GLB,,, | Performed by: OBSTETRICS & GYNECOLOGY

## 2023-06-13 NOTE — PROGRESS NOTES
"Chief Complaint   Patient presents with    Vaginitis    Urinary Tract Infection       HPI:  Hailey Mustafa is a 32 y.o. female patient  who presents today for evaluation of several gyn issues.  For the past day, she describes noting urinary urgency, frequency, and dysuria.   She has had bladder infections in the past and feels that her current symptoms are similar.  Denies fever.   Also, for the past day, she has reports increased vaginal discomfort, irritation, and discharge.  She has had her current Mirena IUD in place since 2022 and essentially does not have any bleeding.  However, she describes experiencing moderate bright red bleeding three days ago after IC.  Patient's last menstrual period was 2023.    Blood pressure 120/68, height 5' 3" (1.6 m), weight 57.7 kg (127 lb 3.3 oz), last menstrual period 2023.    Urine dip today: +blood, +leukocytes, +nitrites    UPT today: Negative    10/18/19 Pap: Negative    Past Medical History:   Diagnosis Date    Anxiety        Past Surgical History:   Procedure Laterality Date    WISDOM TOOTH EXTRACTION           ROS:  GENERAL: Feeling well overall.   SKIN: Reports vulva irritation.  HEAD: Denies head injury or headache.   NODES: Denies enlarged lymph nodes.   CHEST: Denies chest pain or shortness of breath.   CARDIOVASCULAR: Denies palpitations or left sided chest pain.   ABDOMEN: No abdominal pain, nausea, vomiting or rectal bleeding.   URINARY: Reports frequency, urgency, and dysuria.  REPRODUCTIVE: See HPI.   BREASTS: Denies pain, lumps, or nipple discharge.   HEMATOLOGIC: Reports bleeding after IC.  MUSCULOSKELETAL: Denies joint pain or swelling.   NEUROLOGIC: Denies syncope or weakness.   PSYCHIATRIC: Denies depression.    PE:   (chaperone present during entire exam)  APPEARANCE: Well nourished, well developed, in no acute distress.  ABDOMEN: Soft.  Mild suprapubic tenderness. No guarding.  No rebound. No CVA tenderness.  VULVA: " No lesions. Normal female genitalia.  URETHRAL MEATUS: Normal size and location, no lesions, no prolapse.  URETHRA: No masses, tenderness, prolapse or scarring.  VAGINA: Moist and well rugated, mild amount of white discharge.  CERVIX: No lesions and discharge.  IUD strings at os.  ANUS PERINEUM: Normal.    Diagnosis:  1. Dysuria    2. Acute vaginitis    3. Encounter for routine checking of intrauterine contraceptive device (IUD)    4. Postcoital bleeding          PLAN:    Orders Placed This Encounter    VAGINOSIS SCREEN BY DNA PROBE    C. trachomatis/N. gonorrhoeae by AMP DNA [facility]; Cervix    Urine culture    US Pelvis Comp with Transvag NON-OB (xpd    POCT Urine Pregnancy       Patient was counseled today on her urinary symptoms, exam, and office urine dip which are consistent with a UTI.  She will begin Macrobid and urine will be sent for a culture.    We discussed vaginitis: etiologies, diagnosis and treatment.  We will contact her with results of the affirm and GC/CT.    Finally, we discussed the episode of bleeding after IC.  On exam today, the IUD strings appeared appropriate at the cervical os.   We discussed obtaining a pelvic ultrasound to confirm correct location of the IUD.  She would prefer to wait and have this ultrasound performed if she has any additional abnormal bleeding.    Follow-up after testing.  Return for annual exam / pap      I spent a total of 32 minutes on the day of the visit.This includes face to face time and non-face to face time preparing to see the patient (eg, review of tests), Obtaining and/or reviewing separately obtained history, Documenting clinical information in the electronic or other health record, Independently interpreting resultsand communicating results to the patient/family/caregiver, or Care coordination.    This note was created with voice recognition software.  Grammatical, syntax and spelling errors may be inevitable.

## 2023-06-14 ENCOUNTER — PATIENT MESSAGE (OUTPATIENT)
Dept: OBSTETRICS AND GYNECOLOGY | Facility: CLINIC | Age: 33
End: 2023-06-14
Payer: COMMERCIAL

## 2023-06-14 LAB
BACTERIAL VAGINOSIS DNA: NEGATIVE
C TRACH DNA SPEC QL NAA+PROBE: NOT DETECTED
CANDIDA GLABRATA DNA: NEGATIVE
CANDIDA KRUSEI DNA: NEGATIVE
CANDIDA RRNA VAG QL PROBE: POSITIVE
N GONORRHOEA DNA SPEC QL NAA+PROBE: NOT DETECTED
T VAGINALIS RRNA GENITAL QL PROBE: NEGATIVE

## 2023-06-14 RX ORDER — NITROFURANTOIN 25; 75 MG/1; MG/1
100 CAPSULE ORAL 2 TIMES DAILY
Qty: 10 CAPSULE | Refills: 0 | Status: SHIPPED | OUTPATIENT
Start: 2023-06-14 | End: 2023-06-19

## 2023-06-14 RX ORDER — FLUCONAZOLE 150 MG/1
150 TABLET ORAL
Qty: 2 TABLET | Refills: 0 | Status: SHIPPED | OUTPATIENT
Start: 2023-06-14

## 2023-06-15 LAB — BACTERIA UR CULT: ABNORMAL

## 2023-06-16 ENCOUNTER — TELEPHONE (OUTPATIENT)
Dept: OBSTETRICS AND GYNECOLOGY | Facility: CLINIC | Age: 33
End: 2023-06-16
Payer: COMMERCIAL

## 2023-06-16 NOTE — TELEPHONE ENCOUNTER
Called patient:    Discussed final urine culture:      >100k E coli, sensitive to Macrobid,    Affirm with yeast.    She is currently on Macrobid and has taken Diflucan - feels much better.    To let us know if not completely resolved.

## 2024-03-15 ENCOUNTER — TELEPHONE (OUTPATIENT)
Dept: OBSTETRICS AND GYNECOLOGY | Facility: CLINIC | Age: 34
End: 2024-03-15
Payer: COMMERCIAL

## 2024-08-23 DIAGNOSIS — A60.9 HSV (HERPES SIMPLEX VIRUS) ANOGENITAL INFECTION: ICD-10-CM

## 2024-08-23 RX ORDER — VALACYCLOVIR HYDROCHLORIDE 500 MG/1
500 TABLET, FILM COATED ORAL DAILY
Qty: 30 TABLET | Refills: 11 | Status: SHIPPED | OUTPATIENT
Start: 2024-08-23

## 2024-08-23 NOTE — TELEPHONE ENCOUNTER
----- Message from Lliia Carballo sent at 8/23/2024  2:07 PM CDT -----  Pt called to get a refill on her medication Valtrex, the [facility] pharmacy on file is correct.

## 2025-03-11 ENCOUNTER — HOSPITAL ENCOUNTER (EMERGENCY)
Facility: OTHER | Age: 35
Discharge: HOME | End: 2025-03-11
Attending: EMERGENCY MEDICINE
Payer: COMMERCIAL

## 2025-03-11 VITALS
TEMPERATURE: 97 F | HEART RATE: 101 BPM | SYSTOLIC BLOOD PRESSURE: 125 MMHG | OXYGEN SATURATION: 97 % | RESPIRATION RATE: 15 BRPM | WEIGHT: 125 LBS | DIASTOLIC BLOOD PRESSURE: 73 MMHG | BODY MASS INDEX: 22.15 KG/M2 | HEIGHT: 63 IN

## 2025-03-11 DIAGNOSIS — M25.512 ACUTE PAIN OF LEFT SHOULDER: ICD-10-CM
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
## 2025-03-11 DIAGNOSIS — M54.2 NECK PAIN: ICD-10-CM

## 2025-03-11 LAB
B-HCG UR QL: NEGATIVE
CTP QC/QA: YES

## 2025-03-11 PROCEDURE — 81025 URINE PREGNANCY TEST: CPT | Performed by: NURSE PRACTITIONER

## 2025-03-11 PROCEDURE — 99284 EMERGENCY DEPT VISIT MOD MDM: CPT | Mod: 25

## 2025-03-11 PROCEDURE — 25000003 PHARM REV CODE 250: Performed by: NURSE PRACTITIONER

## 2025-03-11 PROCEDURE — 96372 THER/PROPH/DIAG INJ SC/IM: CPT | Performed by: NURSE PRACTITIONER

## 2025-03-11 PROCEDURE — 63600175 PHARM REV CODE 636 W HCPCS: Mod: JZ,TB | Performed by: NURSE PRACTITIONER

## 2025-03-11 RX ORDER — METHOCARBAMOL 750 MG/1
1500 TABLET, FILM COATED ORAL 3 TIMES DAILY
Qty: 30 TABLET | Refills: 0 | Status: SHIPPED | OUTPATIENT
Start: 2025-03-11 | End: 2025-03-16

## 2025-03-11 RX ORDER — IBUPROFEN 600 MG/1
600 TABLET ORAL EVERY 6 HOURS PRN
Qty: 20 TABLET | Refills: 0 | Status: SHIPPED | OUTPATIENT
Start: 2025-03-11

## 2025-03-11 RX ORDER — LIDOCAINE 50 MG/G
1 PATCH TOPICAL DAILY
Qty: 15 PATCH | Refills: 0 | Status: SHIPPED | OUTPATIENT
Start: 2025-03-11

## 2025-03-11 RX ORDER — LIDOCAINE 50 MG/G
1 PATCH TOPICAL ONCE
Status: DISCONTINUED | OUTPATIENT
Start: 2025-03-11 | End: 2025-03-11 | Stop reason: HOSPADM

## 2025-03-11 RX ORDER — KETOROLAC TROMETHAMINE 30 MG/ML
15 INJECTION, SOLUTION INTRAMUSCULAR; INTRAVENOUS
Status: COMPLETED | OUTPATIENT
Start: 2025-03-11 | End: 2025-03-11

## 2025-03-11 RX ORDER — METHOCARBAMOL 750 MG/1
1500 TABLET, FILM COATED ORAL
Status: COMPLETED | OUTPATIENT
Start: 2025-03-11 | End: 2025-03-11

## 2025-03-11 RX ADMIN — LIDOCAINE 1 PATCH: 50 PATCH CUTANEOUS at 05:03

## 2025-03-11 RX ADMIN — METHOCARBAMOL 1500 MG: 750 TABLET ORAL at 05:03

## 2025-03-11 RX ADMIN — KETOROLAC TROMETHAMINE 15 MG: 30 INJECTION, SOLUTION INTRAMUSCULAR at 05:03
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
## 2025-03-11 NOTE — Clinical Note
"Hailey Montesinos" Ramsey was seen and treated in our emergency department on 3/11/2025.  She may return to work on 03/14/2025.       If you have any questions or concerns, please don't hesitate to call.      Jay Weiss NP"
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
## 2025-03-11 NOTE — Clinical Note
"Hailey Montesinos" Ramsey was seen and treated in our emergency department on 3/11/2025.  She may return to work on 03/15/2025.       If you have any questions or concerns, please don't hesitate to call.      Jay Weiss NP"

## 2025-04-09 ENCOUNTER — RESULTS FOLLOW-UP (OUTPATIENT)
Dept: INTERNAL MEDICINE | Facility: CLINIC | Age: 35
End: 2025-04-09

## 2025-04-09 ENCOUNTER — HOSPITAL ENCOUNTER (OUTPATIENT)
Dept: RADIOLOGY | Facility: HOSPITAL | Age: 35
Discharge: HOME | End: 2025-04-09
Attending: FAMILY MEDICINE
Payer: COMMERCIAL

## 2025-04-09 ENCOUNTER — OFFICE VISIT (OUTPATIENT)
Dept: INTERNAL MEDICINE | Facility: CLINIC | Age: 35
End: 2025-04-09
Payer: COMMERCIAL

## 2025-04-09 VITALS
HEIGHT: 63 IN | DIASTOLIC BLOOD PRESSURE: 68 MMHG | SYSTOLIC BLOOD PRESSURE: 112 MMHG | WEIGHT: 125.69 LBS | OXYGEN SATURATION: 98 % | BODY MASS INDEX: 22.27 KG/M2 | HEART RATE: 104 BPM

## 2025-04-09 DIAGNOSIS — M25.512 ACUTE PAIN OF LEFT SHOULDER: Primary | ICD-10-CM

## 2025-04-09 DIAGNOSIS — M25.512 ACUTE PAIN OF LEFT SHOULDER: ICD-10-CM

## 2025-04-09 PROCEDURE — 99214 OFFICE O/P EST MOD 30 MIN: CPT | Mod: S$GLB,,, | Performed by: FAMILY MEDICINE

## 2025-04-09 PROCEDURE — G2211 COMPLEX E/M VISIT ADD ON: HCPCS | Mod: S$GLB,,, | Performed by: FAMILY MEDICINE

## 2025-04-09 PROCEDURE — 99999 PR PBB SHADOW E&M-EST. PATIENT-LVL V: CPT | Mod: PBBFAC,,, | Performed by: FAMILY MEDICINE

## 2025-04-09 PROCEDURE — 1160F RVW MEDS BY RX/DR IN RCRD: CPT | Mod: CPTII,S$GLB,, | Performed by: FAMILY MEDICINE

## 2025-04-09 PROCEDURE — 1159F MED LIST DOCD IN RCRD: CPT | Mod: CPTII,S$GLB,, | Performed by: FAMILY MEDICINE

## 2025-04-09 PROCEDURE — 73030 X-RAY EXAM OF SHOULDER: CPT | Mod: 26,LT,, | Performed by: RADIOLOGY

## 2025-04-09 PROCEDURE — 3008F BODY MASS INDEX DOCD: CPT | Mod: CPTII,S$GLB,, | Performed by: FAMILY MEDICINE

## 2025-04-09 PROCEDURE — 73030 X-RAY EXAM OF SHOULDER: CPT | Mod: TC,LT

## 2025-04-09 PROCEDURE — 3078F DIAST BP <80 MM HG: CPT | Mod: CPTII,S$GLB,, | Performed by: FAMILY MEDICINE

## 2025-04-09 PROCEDURE — 3074F SYST BP LT 130 MM HG: CPT | Mod: CPTII,S$GLB,, | Performed by: FAMILY MEDICINE

## 2025-04-09 RX ORDER — TRETINOIN 0.25 MG/G
1 CREAM TOPICAL NIGHTLY
COMMUNITY
Start: 2025-04-02

## 2025-04-09 RX ORDER — IBUPROFEN 600 MG/1
600 TABLET ORAL EVERY 6 HOURS PRN
Qty: 30 TABLET | Refills: 1 | Status: SHIPPED | OUTPATIENT
Start: 2025-04-09

## 2025-04-09 RX ORDER — SPIRONOLACTONE 50 MG/1
50 TABLET, FILM COATED ORAL
COMMUNITY
Start: 2025-04-02

## 2025-04-09 RX ORDER — LIDOCAINE 50 MG/G
1 PATCH TOPICAL DAILY
Qty: 15 PATCH | Refills: 0 | Status: SHIPPED | OUTPATIENT
Start: 2025-04-09

## 2025-04-09 RX ORDER — CEPHALEXIN 500 MG/1
500 CAPSULE ORAL 2 TIMES DAILY
COMMUNITY
Start: 2025-04-02

## 2025-04-09 RX ORDER — CLINDAMYCIN PHOSPHATE 11.9 MG/ML
1 SOLUTION TOPICAL
COMMUNITY
Start: 2025-04-02
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
## 2025-04-22 ENCOUNTER — CLINICAL SUPPORT (OUTPATIENT)
Dept: REHABILITATION | Facility: OTHER | Age: 35
End: 2025-04-22
Payer: COMMERCIAL

## 2025-04-22 DIAGNOSIS — R29.898 LEFT ARM WEAKNESS: ICD-10-CM

## 2025-04-22 DIAGNOSIS — M25.512 ACUTE PAIN OF LEFT SHOULDER: ICD-10-CM

## 2025-04-22 DIAGNOSIS — M79.602 LEFT ARM PAIN: Primary | ICD-10-CM

## 2025-04-22 PROCEDURE — 97161 PT EVAL LOW COMPLEX 20 MIN: CPT | Mod: PN

## 2025-04-22 PROCEDURE — 97530 THERAPEUTIC ACTIVITIES: CPT | Mod: PN

## 2025-04-22 NOTE — PATIENT INSTRUCTIONS
Access Code: [email or web address]  URL: [email or web address]  Date: 04/22/2025  Prepared by: Kendra Rogers    Exercises  - First Rib Mobilization with Strap  - 1 x daily - 3-5 x weekly - 2 sets - 10 reps - 3 hold  - Median Nerve Mobilization  - 1 x daily - 3-5 x weekly - 2 sets - 10 reps  - Prone Scapular Retraction  - 1 x daily - 2 sets - 10 reps  - Prone Scapular Slide with Shoulder Extension  - 1 x daily - 2 sets - 10 reps  - Prone Scapular Retraction and Row  - 1 x daily - 2 sets - 10 reps
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
## 2025-04-28 ENCOUNTER — DOCUMENTATION ONLY (OUTPATIENT)
Dept: REHABILITATION | Facility: OTHER | Age: 35
End: 2025-04-28

## 2025-04-28 ENCOUNTER — PATIENT MESSAGE (OUTPATIENT)
Dept: REHABILITATION | Facility: OTHER | Age: 35
End: 2025-04-28

## 2025-04-28 DIAGNOSIS — A60.9 HSV (HERPES SIMPLEX VIRUS) ANOGENITAL INFECTION: ICD-10-CM

## 2025-04-28 NOTE — PROGRESS NOTES
Patient failed to appear for scheduled PT appointment today at 0700 without prior notification or cancellation.    Kendra Rogers, PT, DPT  4/28/2025

## 2025-04-29 RX ORDER — VALACYCLOVIR HYDROCHLORIDE 500 MG/1
500 TABLET, FILM COATED ORAL DAILY
Qty: 90 TABLET | Refills: 3 | Status: SHIPPED | OUTPATIENT
Start: 2025-04-29

## 2025-04-29 NOTE — TELEPHONE ENCOUNTER
Refill Routing Note   Medication(s) are not appropriate for processing by [facility] for the following reason(s):        Patient not seen by provider within 15 months  Required labs outdated    [facility] action(s):  Defer               Appointments  past 12m or future 3m with PCP    Date Provider   Last Visit   7/22/2022 Amanda Gerard MD   Next Visit   5/5/2025 Amanda Gerard MD   ED visits in past 90 days: 1        Note composed:8:54 AM 04/29/2025

## 2025-04-30 ENCOUNTER — CLINICAL SUPPORT (OUTPATIENT)
Dept: REHABILITATION | Facility: OTHER | Age: 35
End: 2025-04-30
Payer: COMMERCIAL

## 2025-04-30 DIAGNOSIS — R29.898 LEFT ARM WEAKNESS: ICD-10-CM

## 2025-04-30 DIAGNOSIS — M79.602 LEFT ARM PAIN: Primary | ICD-10-CM

## 2025-04-30 PROCEDURE — 97140 MANUAL THERAPY 1/> REGIONS: CPT | Mod: PN

## 2025-04-30 PROCEDURE — 97112 NEUROMUSCULAR REEDUCATION: CPT | Mod: PN

## 2025-04-30 NOTE — PROGRESS NOTES
Outpatient Rehab    Physical Therapy Visit    Patient Name: Lea Mustafa  MRN: [id number]  YOB: 1990  Encounter Date: 4/30/2025    Therapy Diagnosis:   Encounter Diagnoses   Name Primary?    Left arm pain Yes    Left arm weakness      Physician: Edvin Luis MD    Physician Orders: Eval and Treat  Medical Diagnosis: Acute pain of left shoulder    Visit # / Visits Authorized:  1 / 15  Insurance Authorization Period: 4/22/2025 to 12/31/2025  Date of Evaluation: 4/22/2025  Plan of Care Certification: 4/22/2025 to 7/15/2025  Re-assessment due: 5/22/2025  FOTO: next visit       PT/PTA: PT   Number of PTA visits since last PT visit:0  Time In: 0705   Time Out: 0800  Total Time: 55   Total Billable Time: 55    FOTO:  Intake Score:  %  Survey Score 1:  %  Survey Score 2:  %         Subjective   she was able to do some of the exercises which seem to help.  Pain reported as 4/10.      Objective            Treatment:  Manual Therapy  MT 1: 1st rib MWM on L  MT 2: L median nerve glide in supine  Balance/Neuromuscular Re-Education  NMR 1: Prone scap retraction, extension, W, T 20x 3 second holds; prone lower trap setting 20x 3 second holds  NMR 2: Seated thoracic extension over 1/2 foam x20; seated butterfly x20; thoracic rotation + flexion x10 B; no money with RTB 2x 10; Unilateral horiz ABD with RTB 2x 10 B  NMR 3: Seated median nerve tension 2x 12 on L  NMR 4: Shoulder extension with GTB 2x 10    Time Entry(in minutes):  Manual Therapy Time Entry: 10  Neuromuscular Re-Education Time Entry: 45    Assessment & Plan   Assessment: Initially noted increase in symptoms with some exercises today that improve with continued performance; sx increased and progressive worsening with prone Y's so exercise was modified to scapular isometrics for lower traps. Emphasis on postural retraining to be progressed at future visits as tolerated.  Evaluation/Treatment Tolerance: Patient tolerated treatment  well    Patient will continue to benefit from skilled outpatient physical therapy to address the deficits listed in the problem list box on initial evaluation, provide pt/family education and to maximize pt's level of independence in the home and community environment.     Patient's spiritual, cultural, and educational needs considered and patient agreeable to plan of care and goals.           Plan: continue as per established PT POC with postural re-training and scapular strength/stabilization.    Goals:   Active       1. short term goal        Patient will be independent with home exercise program to promote improved therapy outcomes.         Start:  04/22/25    Expected End:  06/03/25            Patient will  improve bilateral scapular Manual Muscle tests to = 4/5 to promote equal use of bilateral upper extremities in performing functional tasks.       Start:  04/22/25    Expected End:  06/03/25            Patient will be able to hold phone and other objects in the left hand for longer periods of time without dropping to improve  functional use of left upper extremity and quality of life.        Start:  04/22/25    Expected End:  06/03/25               2. long term goal        Patient will improve DASH by 10 point to demonstrate improved functional mobility and use of the arm.        Start:  04/22/25    Expected End:  07/15/25            Patient will report pain </= 2/10 with Activities of daily living using left upper extremity to improve bathing, dressing, grooming and reaching activities.        Start:  04/22/25    Expected End:  07/15/25            Patient will  improve left upper extremity  shoulder Manual Muscle tests to = 4+/5 to promote equal use of bilateral upper extremities in performing functional tasks.       Start:  04/22/25    Expected End:  07/15/25                Kendra Rogers, PT

## 2025-05-05 ENCOUNTER — CLINICAL SUPPORT (OUTPATIENT)
Dept: REHABILITATION | Facility: OTHER | Age: 35
End: 2025-05-05
Payer: COMMERCIAL

## 2025-05-05 DIAGNOSIS — M79.602 LEFT ARM PAIN: Primary | ICD-10-CM

## 2025-05-05 DIAGNOSIS — R29.898 LEFT ARM WEAKNESS: ICD-10-CM

## 2025-05-05 PROCEDURE — 97530 THERAPEUTIC ACTIVITIES: CPT | Mod: PN

## 2025-05-05 PROCEDURE — 97112 NEUROMUSCULAR REEDUCATION: CPT | Mod: PN

## 2025-05-05 NOTE — PROGRESS NOTES
Outpatient Rehab    Physical Therapy Visit    Patient Name: Lea Mustafa  MRN: [id number]  YOB: 1990  Encounter Date: 5/5/2025    Therapy Diagnosis:   Encounter Diagnoses   Name Primary?    Left arm pain Yes    Left arm weakness        Physician: Edvin Luis MD    Physician Orders: Eval and Treat  Medical Diagnosis: Acute pain of left shoulder    Visit # / Visits Authorized:  2 / 15  Insurance Authorization Period: 4/22/2025 to 12/31/2025  Date of Evaluation: 4/22/2025  Plan of Care Certification: 4/22/2025 to 7/15/2025  Re-assessment due: 5/22/2025  FOTO: next visit       PT/PTA: PT   Number of PTA visits since last PT visit:0  Time In: 0703   Time Out: 0800  Total Time: 57   Total Billable Time: 57    FOTO:  Intake Score: 45 (DASH: 53.3)%  Survey Score 1:  %  Survey Score 2:  %         Subjective   felt good after last session for a few hours but around 11-12 that day noted a more intense tingling/numbness feeling in the arm that lasted for an hour or 2 where previously it was only a few seconds-minutes. No sx during session..  Pain reported as 5/10. L shoulder    Objective            Treatment:  Balance/Neuromuscular Re-Education  NMR 1: Prone scap retraction, extension, W, T 20x 3 second holds; prone lower trap setting 20x 3 second holds  NMR 2: Seated thoracic extension over 1/2 foam x20; seated robbery x20; no money with RTB 2x 10; Unilateral horiz ABD with RTB 2x 10 B  NMR 3: Seated median nerve tension 2x 12 on L  NMR 4: Shoulder extension with GTB 2x 10; row w/ GTB 2x 10; Iso ER/IR with double YTB x10 B  NMR 5: Supine over towel roll: pec stretch x3 mins; serratus press with 1# dowel x20; serratus press with orange loop x20  Therapeutic Activity  TA 1: FOTO    Time Entry(in minutes):  Neuromuscular Re-Education Time Entry: 49  Therapeutic Activity Time Entry: 8    Assessment & Plan   Assessment: increased sx with prone Y isometrics that did not linger. Held off with manual  interventions due to incresaed tingling/numbness reported hours after initial treatment session. Initiated serratus interventions in supine today.  Evaluation/Treatment Tolerance: Patient tolerated treatment well    Patient will continue to benefit from skilled outpatient physical therapy to address the deficits listed in the problem list box on initial evaluation, provide pt/family education and to maximize pt's level of independence in the home and community environment.     Patient's spiritual, cultural, and educational needs considered and patient agreeable to plan of care and goals.           Plan: continue as per established PT POC with postural re-training and scapular strength/stabilization.    Goals:   Active       1. short term goal        Patient will be independent with home exercise program to promote improved therapy outcomes.         Start:  04/22/25    Expected End:  06/03/25            Patient will  improve bilateral scapular Manual Muscle tests to = 4/5 to promote equal use of bilateral upper extremities in performing functional tasks.       Start:  04/22/25    Expected End:  06/03/25            Patient will be able to hold phone and other objects in the left hand for longer periods of time without dropping to improve  functional use of left upper extremity and quality of life.        Start:  04/22/25    Expected End:  06/03/25               2. long term goal        Patient will improve DASH by 10 point to demonstrate improved functional mobility and use of the arm.        Start:  04/22/25    Expected End:  07/15/25            Patient will report pain </= 2/10 with Activities of daily living using left upper extremity to improve bathing, dressing, grooming and reaching activities.        Start:  04/22/25    Expected End:  07/15/25            Patient will  improve left upper extremity  shoulder Manual Muscle tests to = 4+/5 to promote equal use of bilateral upper extremities in performing functional  tasks.       Start:  04/22/25    Expected End:  07/15/25                  Kendra Rogers PT

## 2025-05-07 ENCOUNTER — CLINICAL SUPPORT (OUTPATIENT)
Dept: REHABILITATION | Facility: OTHER | Age: 35
End: 2025-05-07
Payer: COMMERCIAL

## 2025-05-07 DIAGNOSIS — R29.898 LEFT ARM WEAKNESS: ICD-10-CM

## 2025-05-07 DIAGNOSIS — M79.602 LEFT ARM PAIN: Primary | ICD-10-CM

## 2025-05-07 PROCEDURE — 97112 NEUROMUSCULAR REEDUCATION: CPT | Mod: PN

## 2025-05-07 PROCEDURE — 97140 MANUAL THERAPY 1/> REGIONS: CPT | Mod: PN

## 2025-05-07 NOTE — PROGRESS NOTES
Outpatient Rehab    Physical Therapy Visit    Patient Name: Lea Mustafa  MRN: [id number]  YOB: 1990  Encounter Date: 5/7/2025    Therapy Diagnosis:   Encounter Diagnoses   Name Primary?    Left arm pain Yes    Left arm weakness        Physician: Edvin Luis MD    Physician Orders: Eval and Treat  Medical Diagnosis: Acute pain of left shoulder    Visit # / Visits Authorized:  3 / 15  Insurance Authorization Period: 4/22/2025 to 12/31/2025  Date of Evaluation: 4/22/2025  Plan of Care Certification: 4/22/2025 to 7/15/2025  Re-assessment due: 5/22/2025  FOTO: next visit       PT/PTA: PT   Number of PTA visits since last PT visit:0  Time In: 0708   Time Out: 0805  Total Time: 57   Total Billable Time: 55    FOTO:  Intake Score:  %  Survey Score 1:  %  Survey Score 2:  %         Subjective   felt okay after last session with no symptoms reported since then. She also hasn't been doing a lot of OH activities which is typically what irritates it..  Pain reported as 4/10. L shoulder    Objective            Treatment:  Therapeutic Exercise  TE 1: EOM shoulder flexion walk back 10x 3-5 second holds  Manual Therapy  MT 1: scapular mobilization on L  MT 2: L median nerve glide in supine to tolerance  MT 3: clavicular glides  Balance/Neuromuscular Re-Education  NMR 2: no money with RTB 2x 10  NMR 3: median nerve tension 2x 15 on L  NMR 4: Shoulder extension with GTB 3x 10; row w/ GTB 3x 10; Iso ER/IR with double YTB x10 B  NMR 6: Pulleys into flexion with scapular blocking x3 minutes; shoulder flexion with manual scapular assist x20  NMR 7: Sidelying on L: ER x20; horizontal ABD x20; flexion to approx. 90 degrees x20; ABD to tolerance x20  NMR 8: Thoracic rotation in sitting to L x10; open book towards R x10    Time Entry(in minutes):  Manual Therapy Time Entry: 10  Neuromuscular Re-Education Time Entry: 43  Therapeutic Exercise Time Entry: 2    Assessment & Plan   Assessment: Less tingling/numbness  with scapular stabilization during OH shoulder activities. Hypomobile scapula on L noted with mobilization.  Evaluation/Treatment Tolerance: Patient tolerated treatment well    Patient will continue to benefit from skilled outpatient physical therapy to address the deficits listed in the problem list box on initial evaluation, provide pt/family education and to maximize pt's level of independence in the home and community environment.     Patient's spiritual, cultural, and educational needs considered and patient agreeable to plan of care and goals.           Plan: continue as per established PT POC with postural re-training and scapular strength/stabilization.    Goals:   Active       1. short term goal        Patient will be independent with home exercise program to promote improved therapy outcomes.   (Progressing)       Start:  04/22/25    Expected End:  06/03/25            Patient will  improve bilateral scapular Manual Muscle tests to = 4/5 to promote equal use of bilateral upper extremities in performing functional tasks. (Progressing)       Start:  04/22/25    Expected End:  06/03/25            Patient will be able to hold phone and other objects in the left hand for longer periods of time without dropping to improve  functional use of left upper extremity and quality of life.  (Progressing)       Start:  04/22/25    Expected End:  06/03/25               2. long term goal        Patient will improve DASH by 10 point to demonstrate improved functional mobility and use of the arm.  (Progressing)       Start:  04/22/25    Expected End:  07/15/25            Patient will report pain </= 2/10 with Activities of daily living using left upper extremity to improve bathing, dressing, grooming and reaching activities.  (Progressing)       Start:  04/22/25    Expected End:  07/15/25            Patient will  improve left upper extremity  shoulder Manual Muscle tests to = 4+/5 to promote equal use of bilateral upper  extremities in performing functional tasks. (Progressing)       Start:  04/22/25    Expected End:  07/15/25                  Kendra Rogers PT

## 2025-05-12 ENCOUNTER — CLINICAL SUPPORT (OUTPATIENT)
Dept: REHABILITATION | Facility: OTHER | Age: 35
End: 2025-05-12
Payer: COMMERCIAL

## 2025-05-12 DIAGNOSIS — R29.898 LEFT ARM WEAKNESS: ICD-10-CM

## 2025-05-12 DIAGNOSIS — M79.602 LEFT ARM PAIN: Primary | ICD-10-CM

## 2025-05-12 PROCEDURE — 97112 NEUROMUSCULAR REEDUCATION: CPT | Mod: PN

## 2025-05-12 NOTE — PROGRESS NOTES
Outpatient Rehab    Physical Therapy Visit    Patient Name: Lea Mustafa  MRN: [id number]  YOB: 1990  Encounter Date: 5/12/2025    Therapy Diagnosis:   Encounter Diagnoses   Name Primary?    Left arm pain Yes    Left arm weakness        Physician: Edvin Luis MD    Physician Orders: Eval and Treat  Medical Diagnosis: Acute pain of left shoulder    Visit # / Visits Authorized:  4 / 15  Insurance Authorization Period: 4/22/2025 to 12/31/2025  Date of Evaluation: 4/22/2025  Plan of Care Certification: 4/22/2025 to 7/15/2025  Re-assessment due: 5/22/2025  FOTO: next visit       PT/PTA: PT   Number of PTA visits since last PT visit:0  Time In: 0708   Time Out: 0802  Total Time: 54   Total Billable Time: 54    FOTO:  Intake Score:  %  Survey Score 1:  %  Survey Score 2:  %         Subjective   she felt okay until Friday and Saturday when she had some flare ups of the pain. When she stretched she felt better. Has not tried yoga yet..  Pain reported as 5/10. L shoulder    Objective            Treatment:  Balance/Neuromuscular Re-Education  NMR 2: no money with RTB 2x 10  NMR 3: median nerve tension 2x 15 on L  NMR 4: Shoulder extension with BTB 2x 10; row w/ BTB 3x 10; Iso 90/90 Er/IR into flexion  NMR 5: Supine over towel roll: pec stretch x3 mins; serratus press with 1# dowel x20; serratus press with orange loop x20; iso ABD with orange loop into flexion x10  NMR 6: Pulleys into flexion with scapular blocking x3 minutes; shoulder flexion AROM with scapular blocking (seatbelt) x20  NMR 7: Sidelying on L: ER x20; horizontal ABD x20; flexion to approx. 90 degrees x20; ABD to tolerance x20  NMR 8: open book towards R x10    Time Entry(in minutes):  Neuromuscular Re-Education Time Entry: 54    Assessment & Plan   Assessment: Improved shoulder ROM and decreased sx remain with 1st rib blocking. Patient instructed to continue with this at home. Progressed resistance with standing exercises to blue  theratube with notable fatigue performing shoulder extension. Continue to progress as tolerated with re-assessment at next visit.  Evaluation/Treatment Tolerance: Patient tolerated treatment well    Patient will continue to benefit from skilled outpatient physical therapy to address the deficits listed in the problem list box on initial evaluation, provide pt/family education and to maximize pt's level of independence in the home and community environment.     Patient's spiritual, cultural, and educational needs considered and patient agreeable to plan of care and goals.     Education  Education was done with Patient. The patient's learning style includes Demonstration. The patient Demonstrates understanding.         Performing shoulder flexion with seatbelt/strap/towel for 1st rib stabilization at home       Plan: continue as per established PT POC with postural re-training and scapular strength/stabilization.    Goals:   Active       1. short term goal        Patient will be independent with home exercise program to promote improved therapy outcomes.   (Progressing)       Start:  04/22/25    Expected End:  06/03/25            Patient will  improve bilateral scapular Manual Muscle tests to = 4/5 to promote equal use of bilateral upper extremities in performing functional tasks. (Progressing)       Start:  04/22/25    Expected End:  06/03/25            Patient will be able to hold phone and other objects in the left hand for longer periods of time without dropping to improve  functional use of left upper extremity and quality of life.  (Progressing)       Start:  04/22/25    Expected End:  06/03/25               2. long term goal        Patient will improve DASH by 10 point to demonstrate improved functional mobility and use of the arm.  (Progressing)       Start:  04/22/25    Expected End:  07/15/25            Patient will report pain </= 2/10 with Activities of daily living using left upper extremity to improve  bathing, dressing, grooming and reaching activities.  (Progressing)       Start:  04/22/25    Expected End:  07/15/25            Patient will  improve left upper extremity  shoulder Manual Muscle tests to = 4+/5 to promote equal use of bilateral upper extremities in performing functional tasks. (Progressing)       Start:  04/22/25    Expected End:  07/15/25                  Kendra Rogers, PT

## 2025-05-14 ENCOUNTER — CLINICAL SUPPORT (OUTPATIENT)
Dept: REHABILITATION | Facility: OTHER | Age: 35
End: 2025-05-14
Payer: COMMERCIAL

## 2025-05-14 DIAGNOSIS — M79.602 LEFT ARM PAIN: Primary | ICD-10-CM

## 2025-05-14 DIAGNOSIS — R29.898 LEFT ARM WEAKNESS: ICD-10-CM

## 2025-05-14 PROCEDURE — 97112 NEUROMUSCULAR REEDUCATION: CPT | Mod: PN

## 2025-05-14 NOTE — PROGRESS NOTES
Outpatient Rehab    Physical Therapy Visit    Patient Name: Lea Mustafa  MRN: [id number]  YOB: 1990  Encounter Date: 5/14/2025    Therapy Diagnosis:   Encounter Diagnoses   Name Primary?    Left arm pain Yes    Left arm weakness          Physician: Edvin Luis MD    Physician Orders: Eval and Treat  Medical Diagnosis: Acute pain of left shoulder    Visit # / Visits Authorized:  5 / 15  Insurance Authorization Period: 4/22/2025 to 12/31/2025  Date of Evaluation: 4/22/2025  Plan of Care Certification: 4/22/2025 to 7/15/2025  Re-assessment due: 5/22/2025  FOTO: next visit       PT/PTA: PT   Number of PTA visits since last PT visit:0  Time In: 0708   Time Out: 0802  Total Time: 54   Total Billable Time: 54 (1:1 tx with therapist/tech supervision)    FOTO:  Intake Score:  %  Survey Score 1:  %  Survey Score 2:  %         Subjective   Noted more discomfort last night. Felt good after last session..  Pain reported as 6/10. L shoulder    Objective            Treatment:  Balance/Neuromuscular Re-Education  NMR 2: no money with RTB 2x 10  NMR 3: median nerve tension 2x 15 on L  NMR 4: Shoulder extension with BTB 2x 10; row w/ BTB 3x 10; Iso 90/90 Er/IR into flexion  NMR 5: Supine over towel roll: pec stretch x3 mins; serratus press with 1# dowel x20; serratus press with orange loop x20; iso ABD with orange loop into flexion x10  NMR 6: Pulleys into flexion with scapular blocking x3 minutes; shoulder flexion AROM with scapular blocking (seatbelt) x20  NMR 7: Sidelying on L: ER x20; horizontal ABD x20; flexion to approx. 90 degrees x20; ABD to tolerance x20  NMR 8: open book towards R x10    Time Entry(in minutes):  Neuromuscular Re-Education Time Entry: 54    Assessment & Plan   Assessment: Increased sx today with OH movements compared to previous treatment sessions. Improved shoulder ROM and reduced sx with therapist manually performing 1st rib blocking and scapular assistance.       Patient will  continue to benefit from skilled outpatient physical therapy to address the deficits listed in the problem list box on initial evaluation, provide pt/family education and to maximize pt's level of independence in the home and community environment.     Patient's spiritual, cultural, and educational needs considered and patient agreeable to plan of care and goals.             Plan: continue as per established PT POC with postural re-training and scapular strength/stabilization.    Goals:   Active       1. short term goal        Patient will be independent with home exercise program to promote improved therapy outcomes.   (Progressing)       Start:  04/22/25    Expected End:  06/03/25            Patient will  improve bilateral scapular Manual Muscle tests to = 4/5 to promote equal use of bilateral upper extremities in performing functional tasks. (Progressing)       Start:  04/22/25    Expected End:  06/03/25            Patient will be able to hold phone and other objects in the left hand for longer periods of time without dropping to improve  functional use of left upper extremity and quality of life.  (Progressing)       Start:  04/22/25    Expected End:  06/03/25               2. long term goal        Patient will improve DASH by 10 point to demonstrate improved functional mobility and use of the arm.  (Progressing)       Start:  04/22/25    Expected End:  07/15/25            Patient will report pain </= 2/10 with Activities of daily living using left upper extremity to improve bathing, dressing, grooming and reaching activities.  (Progressing)       Start:  04/22/25    Expected End:  07/15/25            Patient will  improve left upper extremity  shoulder Manual Muscle tests to = 4+/5 to promote equal use of bilateral upper extremities in performing functional tasks. (Progressing)       Start:  04/22/25    Expected End:  07/15/25                    Kendra Rogers, PT

## 2025-05-19 ENCOUNTER — CLINICAL SUPPORT (OUTPATIENT)
Dept: REHABILITATION | Facility: OTHER | Age: 35
End: 2025-05-19
Payer: COMMERCIAL

## 2025-05-19 DIAGNOSIS — R29.898 LEFT ARM WEAKNESS: ICD-10-CM

## 2025-05-19 DIAGNOSIS — M79.602 LEFT ARM PAIN: Primary | ICD-10-CM

## 2025-05-19 PROCEDURE — 97140 MANUAL THERAPY 1/> REGIONS: CPT | Mod: PN

## 2025-05-19 PROCEDURE — 97530 THERAPEUTIC ACTIVITIES: CPT | Mod: PN

## 2025-05-19 NOTE — PROGRESS NOTES
Outpatient Rehab    Physical Therapy Visit - Progress note    Patient Name: Lea Mustafa  MRN: [id number]  YOB: 1990  Encounter Date: 5/19/2025    Therapy Diagnosis:   Encounter Diagnoses   Name Primary?    Left arm pain Yes    Left arm weakness      Physician: Edvin Luis MD    Physician Orders: Eval and Treat  Medical Diagnosis: Acute pain of left shoulder    Visit # / Visits Authorized:  6 / 15  Insurance Authorization Period: 4/22/2025 to 12/31/2025  Date of Evaluation: 4/22/2025  Plan of Care Certification: 4/22/2025 to 7/15/2025  Re-assessment due: 5/22/2025  FOTO: 5/14/2025 (2/5; 2)     PT/PTA: PT   Number of PTA visits since last PT visit:0  Time In: 0706   Time Out: 0810  Total Time: 64   Total Billable Time: 60    FOTO:  Intake Score:  %  Survey Score 1:  %  Survey Score 2:  %         Subjective   therapy has helped with things but the tingling is still there. She tried yoga and this seemed to really help  but she had difficulty with OH and downward dog positions. She feels like she may have slept weird and the tingling is a little worse this morning..  Pain reported as 5/10. (tingling) L shoulder    Objective      Shoulder Strength - Planes of Motion   Right Strength Right Pain Left Strength Left  Pain   Flexion 5   4 Yes   Extension 5   5     ABduction 5   4 Yes   ADduction           Horizontal ABduction           Horizontal ADduction           Internal Rotation 0° 5   5     Internal Rotation 90°           External Rotation 0° 5   4     External Rotation 90°                 Right  Strength  Right Hand Dynamometer Position: 3  Elbow Position Forearm Position Trial 1 (lbs) Trial 2  (lbs) Trial 3  (lbs) Average  (lbs) Pain   Flexed Neutral 36 31 35 34         Left  Strength  Left Hand Dynamometer Position: 3  Elbow Position Forearm Position Trial 1 (lbs) Trial 2 (lbs) Trial 3 (lbs) Average (lbs) Pain   Flexed Neutral 20 19 22 20.33                 Downward dog: decreased  lat engagement and when cued, noted more tightness and pulling/discomfort in the left elbow.     Increased tension and tingling/numbness with palpation over wrist flexors around elbow on the left     Treatment:  Therapeutic Exercise  TE 2: UBE level 1.5 fwd/back x3 mins each post dry needling  Manual Therapy  MT 3: Dry needling  See EMR under MEDIA for written consent provided.    Lea received the following manual therapy techniques: Dry needling    Application of TDN: Pt educated on benefits and potential side effects of dry needling. Educated pt on benefits, precautions, side effects following TDN. Educated pt to use heat following treatment sessions if pt is experiencing pain or soreness. Pt verbalized good understanding of education.  Pt signed written consent to dry needling. Pt gave verbal consent for DN    Pt received dry needling to the below listed muscles using 30mm needles.  Bilateral C3, 5, 7 paraspinals  Left upper trapezius with fanning in supine (not left in situ)    Winding performed every 5 minutes during treatment.     Therapeutic Activity  TA 1: Re-assessment  TA 2: Dry needling education, consent form, purpose, benefits, risks      Time Entry(in minutes):  Manual Therapy Time Entry: 24  Therapeutic Activity Time Entry: 34  Therapeutic Exercise Time Entry: 6    Assessment & Plan   Assessment: Overall some imiprovement in symptoms although they continue to limit her activities. Endorses continuing to drop objects with  strength deficits noted more on L than R. Initiated dry needling today with verbal and written consent prior to treatment. Improvement in tingling and numbness in OH position noted after session, particularly after upper trap needling where multiple muscle twitches were noted.  Evaluation/Treatment Tolerance: Patient tolerated treatment well    Patient will continue to benefit from skilled outpatient physical therapy to address the deficits listed in the problem list box on  initial evaluation, provide pt/family education and to maximize pt's level of independence in the home and community environment.     Patient's spiritual, cultural, and educational needs considered and patient agreeable to plan of care and goals.     Education  Education was done with Patient.           Dry needling consent form, purpose, risks, minimum of 3 visits for effectiveness, max of 10-12 visits; use of heat at home for soreness; avoiding NSAIDs for 24 hours post needling         Plan: continue as per established PT POC with postural re-training and scapular strength/stabilization. monitor effects of dry needling.    Goals:   Active       1. short term goal        Patient will be independent with home exercise program to promote improved therapy outcomes.   (Ongoing)       Start:  04/22/25    Expected End:  06/03/25            Patient will  improve bilateral scapular Manual Muscle tests to = 4/5 to promote equal use of bilateral upper extremities in performing functional tasks. (Progressing)       Start:  04/22/25    Expected End:  06/03/25            Patient will be able to hold phone and other objects in the left hand for longer periods of time without dropping to improve  functional use of left upper extremity and quality of life.  (Progressing)       Start:  04/22/25    Expected End:  06/03/25               2. long term goal        Patient will improve DASH by 10 point to demonstrate improved functional mobility and use of the arm.  (Progressing)       Start:  04/22/25    Expected End:  07/15/25            Patient will report pain </= 2/10 with Activities of daily living using left upper extremity to improve bathing, dressing, grooming and reaching activities.  (Progressing)       Start:  04/22/25    Expected End:  07/15/25            Patient will  improve left upper extremity  shoulder Manual Muscle tests to = 4+/5 to promote equal use of bilateral upper extremities in performing functional tasks.  (Progressing)       Start:  04/22/25    Expected End:  07/15/25                    Kendra Rogers PT

## 2025-05-26 ENCOUNTER — DOCUMENTATION ONLY (OUTPATIENT)
Dept: REHABILITATION | Facility: OTHER | Age: 35
End: 2025-05-26
Payer: COMMERCIAL

## 2025-05-26 NOTE — PROGRESS NOTES
Patient failed to appear for scheduled PT appointment today at 7 am without prior notification or cancellation.    Kendra Rogers, PT, DPT  5/26/2025

## 2025-05-28 ENCOUNTER — CLINICAL SUPPORT (OUTPATIENT)
Dept: REHABILITATION | Facility: OTHER | Age: 35
End: 2025-05-28
Payer: COMMERCIAL

## 2025-05-28 DIAGNOSIS — M79.602 LEFT ARM PAIN: Primary | ICD-10-CM

## 2025-05-28 DIAGNOSIS — R29.898 LEFT ARM WEAKNESS: ICD-10-CM

## 2025-05-28 PROCEDURE — 97112 NEUROMUSCULAR REEDUCATION: CPT | Mod: PN

## 2025-05-28 PROCEDURE — 97140 MANUAL THERAPY 1/> REGIONS: CPT | Mod: PN

## 2025-05-28 NOTE — PROGRESS NOTES
Outpatient Rehab    Physical Therapy Visit    Patient Name: Lea Mustafa  MRN: [id number]  YOB: 1990  Encounter Date: 5/28/2025    Therapy Diagnosis:   Encounter Diagnoses   Name Primary?    Left arm pain Yes    Left arm weakness      Physician: Edvin Luis MD    Physician Orders: Eval and Treat  Medical Diagnosis: Acute pain of left shoulder    Visit # / Visits Authorized:  7 / 15  Insurance Authorization Period: 4/22/2025 to 12/31/2025  Date of Evaluation: 4/9/2025 4/22/2025  Plan of Care Certification: 4/22/2025 to 7/15/2025      PT/PTA: PT   Number of PTA visits since last PT visit:0  Time In: 0709   Time Out: 0804  Total Time (in minutes): 55   Total Billable Time (in minutes): 55    FOTO:  Intake Score:  %  Survey Score 2:  %  Survey Score 3:  %    Precautions:  Standard      Subjective   She was very sore after the needling last session for about a day but then it went away. She did notice that her hand felt warmer and has stayed that way since then. she is hesitant to try the dry needling again due the soreness from last visit but is open to one more try today. she did not use heat after last visit..  Pain reported as 4/10. L shoulder    Objective            Treatment:    Lea received the treatments listed below:      received therapeutic exercises to develop strength and range of motion for 6  minutes including:  [x] Upper body ergonometer level 1.5 forward/back x3 mins each post dry needling       received the following manual therapy techniques: Soft tissue Mobilization were applied to the: left neck/shoulder for 15 minutes, including:  [x] See EMR under MEDIA for written consent provided.    Application of TDN: Pt educated on benefits and potential side effects of dry needling. Educated pt on benefits, precautions, side effects following TDN. Educated pt to use heat following treatment sessions if pt is experiencing pain or soreness. Pt verbalized good understanding of  education.  Pt signed written consent to dry needling. Pt gave verbal consent for DN    Pt received dry needling to the below listed muscles using 30mm needles.  Left C3, 5, 7 paraspinals  Left upper trapezius with fanning in supine (not left in situ)    Winding performed every 5 minutes during treatment.     received the following dynamic functional therapeutic activities to improve functional performance for 00  minutes, including:  [] Re-assessment/Focus On therapeutic Outcomes (FOTO)     received the following neuromuscular re-education activities to improve: Coordination, Proprioception, and Posture for 34 minutes. The following activities were included:  [x] Over towel roll (with moist hot pack):  pec stretch x3 mins; serratus press with 1# dowel x20; serratus press with orange loop x20; iso ABD with orange loop into flexion x10; no money with red theraband 2x 10   [x] Pulleys into flexion with scapular blocking x3 minutes (with moist hot pack)  [x] Sidelying on L: ER x20; horizontal ABD x20; flexion to approx. 90 degrees x20; ABD to tolerance x20     received hot pack for 10 minutes to left shoulder/neck.    Time Entry(in minutes):  Hot/Cold Pack Time Entry: 10 (with exercises as noted)  Manual Therapy Time Entry: 15  Neuromuscular Re-Education Time Entry: 34  Therapeutic Exercise Time Entry: 6    Assessment & Plan   Assessment: Continued with dry needling. Advised for use of heat for any residual soreness. Improved sx post dry needling today that continued throughout the session with reduced tingling and numbness compared to arrival and with OH positioning. Improved tissue tension noted post treatment. continue with dry needling PRN and postural re-training exercises.  Evaluation/Treatment Tolerance: Patient tolerated treatment well    The patient will continue to benefit from skilled outpatient physical therapy in order to address the deficits listed in the problem list on the initial evaluation, provide  patient and family education, and maximize the patients level of independence in the home and community environments.     The patient's spiritual, cultural, and educational needs were considered, and the patient is agreeable to the plan of care and goals.           Plan: continue as per established PT POC with postural re-training and scapular strength/stabilization. monitor effects of dry needling.    Goals:   Active       1. short term goal        Patient will be independent with home exercise program to promote improved therapy outcomes.   (Ongoing)       Start:  04/22/25    Expected End:  06/03/25            Patient will  improve bilateral scapular Manual Muscle tests to = 4/5 to promote equal use of bilateral upper extremities in performing functional tasks. (Progressing)       Start:  04/22/25    Expected End:  06/03/25            Patient will be able to hold phone and other objects in the left hand for longer periods of time without dropping to improve  functional use of left upper extremity and quality of life.  (Progressing)       Start:  04/22/25    Expected End:  06/03/25               2. long term goal        Patient will improve DASH by 10 point to demonstrate improved functional mobility and use of the arm.  (Progressing)       Start:  04/22/25    Expected End:  07/15/25            Patient will report pain </= 2/10 with Activities of daily living using left upper extremity to improve bathing, dressing, grooming and reaching activities.  (Progressing)       Start:  04/22/25    Expected End:  07/15/25            Patient will  improve left upper extremity  shoulder Manual Muscle tests to = 4+/5 to promote equal use of bilateral upper extremities in performing functional tasks. (Progressing)       Start:  04/22/25    Expected End:  07/15/25                Kendra Rogers, PT

## 2025-06-04 ENCOUNTER — CLINICAL SUPPORT (OUTPATIENT)
Dept: REHABILITATION | Facility: OTHER | Age: 35
End: 2025-06-04
Payer: COMMERCIAL

## 2025-06-04 DIAGNOSIS — M79.602 LEFT ARM PAIN: Primary | ICD-10-CM

## 2025-06-04 DIAGNOSIS — R29.898 LEFT ARM WEAKNESS: ICD-10-CM

## 2025-06-04 PROCEDURE — 97140 MANUAL THERAPY 1/> REGIONS: CPT | Mod: PN

## 2025-06-04 PROCEDURE — 97112 NEUROMUSCULAR REEDUCATION: CPT | Mod: PN

## 2025-06-06 ENCOUNTER — TELEPHONE (OUTPATIENT)
Dept: INTERNAL MEDICINE | Facility: CLINIC | Age: 35
End: 2025-06-06
Payer: COMMERCIAL

## 2025-06-09 ENCOUNTER — OFFICE VISIT (OUTPATIENT)
Dept: INTERNAL MEDICINE | Facility: CLINIC | Age: 35
End: 2025-06-09
Payer: COMMERCIAL

## 2025-06-09 ENCOUNTER — DOCUMENTATION ONLY (OUTPATIENT)
Dept: REHABILITATION | Facility: OTHER | Age: 35
End: 2025-06-09
Payer: COMMERCIAL

## 2025-06-09 DIAGNOSIS — R41.840 CONCENTRATION DEFICIT: Primary | ICD-10-CM

## 2025-06-09 DIAGNOSIS — F41.8 PERFORMANCE ANXIETY: ICD-10-CM

## 2025-06-09 PROCEDURE — G2211 COMPLEX E/M VISIT ADD ON: HCPCS | Mod: 95,,, | Performed by: FAMILY MEDICINE

## 2025-06-09 PROCEDURE — 1160F RVW MEDS BY RX/DR IN RCRD: CPT | Mod: CPTII,95,, | Performed by: FAMILY MEDICINE

## 2025-06-09 PROCEDURE — 98006 SYNCH AUDIO-VIDEO EST MOD 30: CPT | Mod: 95,,, | Performed by: FAMILY MEDICINE

## 2025-06-09 PROCEDURE — 1159F MED LIST DOCD IN RCRD: CPT | Mod: CPTII,95,, | Performed by: FAMILY MEDICINE

## 2025-06-09 RX ORDER — PROPRANOLOL HYDROCHLORIDE 10 MG/1
TABLET ORAL
Qty: 30 TABLET | Refills: 2 | Status: SHIPPED | OUTPATIENT
Start: 2025-06-09

## 2025-06-09 NOTE — PROGRESS NOTES
The patient location is: [place]  The chief complaint leading to consultation is: checkup     Visit type: audiovisual    Face to Face time with patient: 10 min   30 minutes of total time spent on the encounter, which includes face to face time and non-face to face time preparing to see the patient (eg, review of tests), Obtaining and/or reviewing separately obtained history, Documenting clinical information in the electronic or other health record, Independently interpreting results (not separately reported) and communicating results to the patient/family/caregiver, or Care coordination (not separately reported).         Each patient to whom he or she provides medical services by telemedicine is:  (1) informed of the relationship between the physician and patient and the respective role of any other health care provider with respect to management of the patient; and (2) notified that he or she may decline to receive medical services by telemedicine and may withdraw from such care at any time.    Notes:   Subjective:       Patient ID: Hailey Mustafa is a 34 y.o. female.    Chief Complaint: No chief complaint on file.    HPI    Hailey Mustafa is a 34 y.o. female for virtual.    Req for adhd testing  Psychologist out of network    Try bb for perf anx prn     #Obgyn:  - est w/ Dr. Saba,  6/2023  - Mirena IUD     #Psych: [facility]  - est w/ counselor, sees ~q2wks  - cymbalta in the past, lexapro, zoloft in the past (drowsiness)  - wellbutrin in past (did not tolerate)    Review of Systems   Constitutional:  Positive for activity change. Negative for unexpected weight change.   HENT:  Negative for hearing loss, rhinorrhea and trouble swallowing.    Eyes:  Negative for discharge and visual disturbance.   Respiratory:  Negative for chest tightness and wheezing.    Cardiovascular:  Positive for palpitations. Negative for chest pain.   Gastrointestinal:  Negative for blood in stool, constipation, diarrhea  and vomiting.   Endocrine: Negative for polydipsia and polyuria.   Genitourinary:  Negative for difficulty urinating, dysuria, hematuria and menstrual problem.   Musculoskeletal:  Positive for arthralgias and neck pain. Negative for joint swelling.   Neurological:  Positive for headaches. Negative for weakness.   Psychiatric/Behavioral:  Positive for dysphoric mood. Negative for confusion.          Past Medical History:   Diagnosis Date    Anxiety         Prior to Admission medications    Medication Sig Start Date End Date Taking? Authorizing Provider   cephALEXin (KEFLEX) 500 MG capsule Take 500 mg by mouth 2 (two) times daily.  Patient not taking: Reported on 4/9/2025 4/2/25   Provider, Historical   clindamycin (CLEOCIN T) 1 % external solution Apply 1 application  topically. 4/2/25   Provider, Historical   EPINEPHrine (EPIPEN) 0.3 mg/0.3 mL AtIn Inject 0.3 mLs (0.3 mg total) into the muscle once as needed (allergic reaction). 4/22/21 4/22/21  Edvin Luis MD   ibuprofen (ADVIL,MOTRIN) 600 MG tablet Take 1 tablet (600 mg total) by mouth every 6 (six) hours as needed for Pain. 4/9/25   Edvin Luis MD   levonorgestreL (MIRENA) 20 mcg/24 hours (6 yrs) 52 mg IUD 1 each by Intrauterine route once.    Provider, Historical   LIDOcaine (LIDODERM) 5 % Place 1 patch onto the skin once daily. Remove & Discard patch within 12 hours or as directed by MD 4/9/25   Edvin Luis MD   spironolactone (ALDACTONE) 50 MG tablet Take 50 mg by mouth. 4/2/25   Provider, Historical   tretinoin (RETIN-A) 0.025 % cream Apply 1 application  topically every evening. 4/2/25   Provider, Historical   valACYclovir (VALTREX) 500 MG tablet Take 1 tablet (500 mg total) by mouth once daily. 4/29/25   Amanda Gerard MD        Past medical history, surgical history, and family medical history reviewed and updated as appropriate.    Medications and allergies reviewed.     Objective:          There were no vitals filed for this  "visit.  There is no height or weight on file to calculate BMI.  Physical Exam  Constitutional:       General: She is not in acute distress.     Appearance: Normal appearance.   Eyes:      Extraocular Movements: Extraocular movements intact.   Pulmonary:      Effort: Pulmonary effort is normal. No respiratory distress.   Neurological:      Mental Status: She is alert and oriented to person, place, and time.   Psychiatric:         Mood and Affect: Mood normal.         Behavior: Behavior normal.         No results found for: "WBC", "HGB", "HCT", "PLT", "CHOL", "TRIG", "HDL", "LDLDIRECT", "ALT", "AST", "NA", "K", "CL", "CREATININE", "BUN", "CO2", "TSH", "PSA", "INR", "GLUF", "HGBA1C", "MICROALBUR"    Assessment:       1. Concentration deficit    2. Performance anxiety          Plan:   1. Concentration deficit  -     Ambulatory referral/consult to Psychiatry; Future; Expected date: 06/09/2025    2. Performance anxiety  -     propranoloL (INDERAL) 10 MG tablet; Take 10 or 20 mg administered 30 to 60 minutes prior to anxiety-provoking situation; if initial dose is not sufficiently effective, may increase by 10 to 20 mg prior to next anxiety-provoking situation up to a maximum of 60 mg  Dispense: 30 tablet; Refill: 2        Health maintenance reviewed with patient.     No follow-ups on file.    As this patient's primary care physician, I am actively managing and/or treating his/her chronic medical conditions now and in the future. This includes, but is not limited to, medication management, coordination of care, documentation review from his/her specialists, and labs/imaging review that I have performed to prepare for this visit as well as will do so in the future as part of my care continuity for this patient.    Edvin Luis MD  Family Medicine / Primary Care  [facility]  6/9/2025                        "

## 2025-06-09 NOTE — PROGRESS NOTES
Patient failed to appear for scheduled PT appointment today at 7 am without prior notification or cancellation.    Kendra Rogers, PT, DPT  6/9/2025

## 2025-06-16 ENCOUNTER — PATIENT MESSAGE (OUTPATIENT)
Dept: INTERNAL MEDICINE | Facility: CLINIC | Age: 35
End: 2025-06-16
Payer: COMMERCIAL

## 2025-06-25 ENCOUNTER — CLINICAL SUPPORT (OUTPATIENT)
Dept: REHABILITATION | Facility: OTHER | Age: 35
End: 2025-06-25
Payer: COMMERCIAL

## 2025-06-25 DIAGNOSIS — M79.602 LEFT ARM PAIN: Primary | ICD-10-CM

## 2025-06-25 DIAGNOSIS — R29.898 LEFT ARM WEAKNESS: ICD-10-CM

## 2025-06-25 PROCEDURE — 97112 NEUROMUSCULAR REEDUCATION: CPT | Mod: PN

## 2025-06-25 PROCEDURE — 97140 MANUAL THERAPY 1/> REGIONS: CPT | Mod: PN

## 2025-06-25 NOTE — PROGRESS NOTES
Outpatient Rehab    Physical Therapy Visit    Patient Name: Lea Mustafa  MRN: [id number]  YOB: 1990  Encounter Date: 6/25/2025    Therapy Diagnosis:   Encounter Diagnoses   Name Primary?    Left arm pain Yes    Left arm weakness      Physician: Edvin Luis MD    Physician Orders: Eval and Treat  Medical Diagnosis: Acute pain of left shoulder  Surgical Diagnosis: Not applicable for this Episode   Surgical Date: Not applicable for this Episode  Days Since Last Surgery: Not applicable for this Episode    Visit # / Visits Authorized:  9 / 23  Insurance Authorization Period: 4/22/2025 to 12/31/2025  Date of Evaluation: 4/22/2025  Plan of Care Certification: 4/22/2025 to 7/15/2025      PT/PTA: PT   Number of PTA visits since last PT visit:0  Time In: 0704   Time Out: 0800  Total Time (in minutes): 56   Total Billable Time (in minutes): 56    FOTO:  Intake Score:  %  Survey Score 2:  %  Survey Score 3:  %    Precautions:     Standard      Subjective   she got worried that the tingling would be a forever thing and went to go see someone who recommended she get a nerve conduction test. The needling was significantly helping with symptoms as she has noticed an increase particularly over the last week after missing the last 2 visits. She continues to drop things and has not really noticed a difference in that area..  Pain reported as 4/10. L shoulder and tingling    Objective            Treatment:  Therapeutic Exercise  TE 2: UBE level 1.5 fwd/back x3 mins each after dry needling  Manual Therapy  MT 3: Application of TDN: Pt educated on benefits and potential side effects of dry needling. Educated pt on benefits, precautions, side effects following TDN. Educated pt to use heat following treatment sessions if pt is experiencing pain or soreness. Pt verbalized good understanding of education.  Pt signed written consent to dry needling. Pt gave verbal consent for DN    Pt received dry needling to the  below listed muscles using 30mm needles.  Left C3, 5, 7 paraspinals  Left upper trapezius     Winding performed every 5 minutes during treatment.  Balance/Neuromuscular Re-Education  NMR 6: Pulleys into flexion with scapular blocking x4 minutes with MHP  NMR 7: supine flexion with therapist blocking scapula x15; seated flexion x15; 1st rib mobilization with 5 deep breaths    Time Entry(in minutes):  Hot/Cold Pack Time Entry: 10  Manual Therapy Time Entry: 25  Neuromuscular Re-Education Time Entry: 25  Therapeutic Exercise Time Entry: 6    Assessment & Plan   Assessment: Continued with dry needling as patient notes more sx today compared to previous session which may be attributed to missing the last 2 weeks. Increased tissue tension noted particularly on left in C3 and upper trapezius points. Palpable intermittent popping in left shoulder with flexion that improved with manual assist. Held off with re-assessment today due to increased sx and missed visits. Plan to re-assess at next visit and continue with therapy and dry needling.   Evaluation/Treatment Tolerance: Patient tolerated treatment well    The patient will continue to benefit from skilled outpatient physical therapy in order to address the deficits listed in the problem list on the initial evaluation, provide patient and family education, and maximize the patients level of independence in the home and community environments.     The patient's spiritual, cultural, and educational needs were considered, and the patient is agreeable to the plan of care and goals.           Plan: continue as per established PT POC with postural re-training and scapular strength/stabilization. monitor effects of dry needling.    Goals:   Active       1. short term goal        Patient will be independent with home exercise program to promote improved therapy outcomes.   (Ongoing)       Start:  04/22/25    Expected End:  06/03/25            Patient will  improve bilateral  scapular Manual Muscle tests to = 4/5 to promote equal use of bilateral upper extremities in performing functional tasks. (Progressing)       Start:  04/22/25    Expected End:  06/03/25            Patient will be able to hold phone and other objects in the left hand for longer periods of time without dropping to improve  functional use of left upper extremity and quality of life.  (Progressing)       Start:  04/22/25    Expected End:  06/03/25               2. long term goal        Patient will improve DASH by 10 point to demonstrate improved functional mobility and use of the arm.  (Progressing)       Start:  04/22/25    Expected End:  07/15/25            Patient will report pain </= 2/10 with Activities of daily living using left upper extremity to improve bathing, dressing, grooming and reaching activities.  (Progressing)       Start:  04/22/25    Expected End:  07/15/25            Patient will  improve left upper extremity  shoulder Manual Muscle tests to = 4+/5 to promote equal use of bilateral upper extremities in performing functional tasks. (Progressing)       Start:  04/22/25    Expected End:  07/15/25                Kendra Rogers, PT

## 2025-07-02 ENCOUNTER — CLINICAL SUPPORT (OUTPATIENT)
Dept: REHABILITATION | Facility: OTHER | Age: 35
End: 2025-07-02
Payer: COMMERCIAL

## 2025-07-02 DIAGNOSIS — R29.898 LEFT ARM WEAKNESS: ICD-10-CM

## 2025-07-02 DIAGNOSIS — M79.602 LEFT ARM PAIN: Primary | ICD-10-CM

## 2025-07-02 PROCEDURE — 97112 NEUROMUSCULAR REEDUCATION: CPT | Mod: PN

## 2025-07-02 PROCEDURE — 97140 MANUAL THERAPY 1/> REGIONS: CPT | Mod: PN
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
## 2025-07-09 ENCOUNTER — CLINICAL SUPPORT (OUTPATIENT)
Dept: REHABILITATION | Facility: OTHER | Age: 35
End: 2025-07-09
Payer: COMMERCIAL

## 2025-07-09 DIAGNOSIS — M79.602 LEFT ARM PAIN: Primary | ICD-10-CM

## 2025-07-09 DIAGNOSIS — R29.898 LEFT ARM WEAKNESS: ICD-10-CM

## 2025-07-09 PROCEDURE — 97140 MANUAL THERAPY 1/> REGIONS: CPT | Mod: PN

## 2025-07-09 PROCEDURE — 97112 NEUROMUSCULAR REEDUCATION: CPT | Mod: PN

## 2025-07-09 PROCEDURE — 97110 THERAPEUTIC EXERCISES: CPT | Mod: PN

## 2025-07-09 NOTE — PROGRESS NOTES
Outpatient Rehab    Physical Therapy Visit    Patient Name: Lea Mustafa  MRN: [id number]  YOB: 1990  Encounter Date: 7/9/2025    Therapy Diagnosis:   Encounter Diagnoses   Name Primary?    Left arm pain Yes    Left arm weakness      Physician: Edvin Luis MD    Physician Orders: Eval and Treat  Medical Diagnosis: Acute pain of left shoulder  Surgical Diagnosis: Not applicable for this Episode   Surgical Date: Not applicable for this Episode  Days Since Last Surgery: Not applicable for this Episode    Visit # / Visits Authorized:  11 / 23  Insurance Authorization Period: 4/22/2025 to 12/31/2025  Date of Evaluation: 4/22/2025  Plan of Care Certification: 4/22/2025 to 7/15/2025      PT/PTA: PT   Number of PTA visits since last PT visit:0  Time In: 0706   Time Out: 0803  Total Time (in minutes): 57   Total Billable Time (in minutes): 55    FOTO:  Intake Score:  %  Survey Score 2:  %  Survey Score 3:  %    Precautions:       Subjective   she felt really good after last session and was able to sleep with her arm above her head without any pain. She did note some discomfort in the side on the L for the day of the needling that resolved..    L shoulder and tingling    Objective            Treatment:  Therapeutic Exercise  TE 2: UBE level 1.5 fwd/back x4 mins each after dry needling  TE 3: supination/pronation with 1# weight x20 on L ; wrist flexion/extension with 1# weight x20 ; bicep curl with 1# weight x20 (all with MHP)  Manual Therapy  MT 3: Application of TDN: Pt educated on benefits and potential side effects of dry needling. Educated pt on benefits, precautions, side effects following TDN. Educated pt to use heat following treatment sessions if pt is experiencing pain or soreness. Pt verbalized good understanding of education.  Pt signed written consent to dry needling. Pt gave verbal consent for DN    Pt received dry needling to the below listed muscles using 30mm needles.  Left C3, 5,  7 paraspinals  Left upper trapezius, L levator scap, L teres major     Winding performed every 5 minutes during treatment.  Balance/Neuromuscular Re-Education  NMR 2: AAROM wrist flexion in supination with 5-10 second hold x5 on L  NMR 6: Pulleys into flexion/scaption with scapular blocking 3 minutes each and MHP  NMR 7: seated flexion with 1# weight and scapular blocking x10 with MHP      Time Entry(in minutes):  Manual Therapy Time Entry: 15  Neuromuscular Re-Education Time Entry: 32  Therapeutic Exercise Time Entry: 8    Assessment & Plan   Assessment: Continued with dry needling and improvement in sx at the end of the session. Tender to palpation noted in medial elbow region with associated tingling and numbness that resolved once pressure was removed. Additionally, limited supination that improved with overpressure as well as active range of motion into wrist flexion with forearm supinated. She was able to hold the position if passively placed into wrist flexion but difficulty getting to end range independently. Encouraged performing passive range of motion into wrist flexion with forearm supinated and holding for improved muscle activation into end ranges.   Evaluation/Treatment Tolerance: Patient tolerated treatment well    The patient will continue to benefit from skilled outpatient physical therapy in order to address the deficits listed in the problem list on the initial evaluation, provide patient and family education, and maximize the patients level of independence in the home and community environments.     The patient's spiritual, cultural, and educational needs were considered, and the patient is agreeable to the plan of care and goals.           Plan: continue as per established PT POC with postural re-training and scapular strength/stabilization. monitor effects of dry needling. Re-assessment and FOTO at next visit.    Goals:   Active       1. short term goal        Patient will be independent with  home exercise program to promote improved therapy outcomes.   (Ongoing)       Start:  04/22/25    Expected End:  06/03/25            Patient will  improve bilateral scapular Manual Muscle tests to = 4/5 to promote equal use of bilateral upper extremities in performing functional tasks. (Progressing)       Start:  04/22/25    Expected End:  06/03/25            Patient will be able to hold phone and other objects in the left hand for longer periods of time without dropping to improve  functional use of left upper extremity and quality of life.  (Progressing)       Start:  04/22/25    Expected End:  06/03/25               2. long term goal        Patient will improve DASH by 10 point to demonstrate improved functional mobility and use of the arm.  (Progressing)       Start:  04/22/25    Expected End:  07/15/25            Patient will report pain </= 2/10 with Activities of daily living using left upper extremity to improve bathing, dressing, grooming and reaching activities.  (Progressing)       Start:  04/22/25    Expected End:  07/15/25            Patient will  improve left upper extremity  shoulder Manual Muscle tests to = 4+/5 to promote equal use of bilateral upper extremities in performing functional tasks. (Progressing)       Start:  04/22/25    Expected End:  07/15/25                Kendra Rogers, PT

## 2025-07-16 ENCOUNTER — CLINICAL SUPPORT (OUTPATIENT)
Dept: REHABILITATION | Facility: OTHER | Age: 35
End: 2025-07-16
Payer: COMMERCIAL

## 2025-07-16 DIAGNOSIS — M79.602 LEFT ARM PAIN: Primary | ICD-10-CM

## 2025-07-16 DIAGNOSIS — R29.898 LEFT ARM WEAKNESS: ICD-10-CM

## 2025-07-16 PROCEDURE — 97014 ELECTRIC STIMULATION THERAPY: CPT | Mod: PN

## 2025-07-16 PROCEDURE — 97112 NEUROMUSCULAR REEDUCATION: CPT | Mod: PN

## 2025-07-16 PROCEDURE — 97530 THERAPEUTIC ACTIVITIES: CPT | Mod: PN

## 2025-07-16 PROCEDURE — 20561 NDL INSJ W/O NJX 3+ MUSC: CPT | Mod: PN

## 2025-07-16 NOTE — PROGRESS NOTES
Outpatient Rehab    Physical Therapy Progress Note : Updated Plan of Care    Patient Name: Lea Mustafa  MRN: [id number]  YOB: 1990  Encounter Date: 7/16/2025    Therapy Diagnosis:   Encounter Diagnoses   Name Primary?    Left arm pain Yes    Left arm weakness      Physician: Edvin Luis MD    Physician Orders: Eval and Treat  Medical Diagnosis: Acute pain of left shoulder    Visit # / Visits Authorized:  12 / 23  Insurance Authorization Period: 4/22/2025 to 12/31/2025  Date of Evaluation: 4/22/2025   Plan of Care Certification: 4/22/2025 to 7/15/2025      PT/PTA: PT   Number of PTA visits since last PT visit:0  Time In: 0703   Time Out: 0800  Total Time (in minutes): 57   Total Billable Time (in minutes): 55    FOTO:  Intake Score: 45%  Survey Score 2: 47%   Survey Score 3: 49%     Precautions:  Additional Precautions and Protocol Details: Standard    Subjective   the dry needling benefits lasted longer after last session with sx starting again yesterday. She is wondering if she needs to have the nerve conduction test performed.The shoulder and neck are feeling pretty much back to normal. She continues to endorse tingling and numbness especially in the hand but this has also improved with dry needling..  Pain reported as 2/10. L arm/hand tingling    Objective      Shoulder Strength - Planes of Motion   Right Strength Right Pain Left Strength Left  Pain   Flexion 5   4     Extension 5   5     ABduction 5   4     ADduction           Horizontal ABduction           Horizontal ADduction           Internal Rotation 0° 5   5     Internal Rotation 90°           External Rotation 0° 5   4     External Rotation 90°               Shoulder Strength - Scapular Stabilizing Muscles   Right Strength Right Pain Left Strength Left  Pain   Lower Trapezius 4-   3+     Middle Trapezius 4   4-     Rhomboids 4+   4         Left  Strength  Left Hand Dynamometer Position: 2  Elbow Position Forearm Position  Trial 1 (lbs) Trial 2 (lbs) Trial 3 (lbs) Average (lbs) Pain   Flexed Neutral 21 25 26 24                   Treatment:  Therapeutic Exercise  TE 2: UBE level 1.5 fwd/back x3 mins each after dry needling  Balance/Neuromuscular Re-Education  NMR 6: Pulleys into flexion/scaption with scapular blocking 3 minutes each and MHP  NMR 8: no money with RTB 2x 10  Therapeutic Activity  TA 1: Re-assessment/FOTO, update to DN billing  Modalities  Dry Needling: Application of TDN: Pt educated on benefits and potential side effects of dry needling. Educated pt on benefits, precautions, side effects following TDN. Educated pt to use heat following treatment sessions if pt is experiencing pain or soreness. Pt verbalized good understanding of education. Pt signed written consent to dry needling. Pt gave verbal consent for DN Pt received dry needling to the below listed muscles using 30mm needles. Left C3, 5, 7 paraspinals, L levator scap, L rhomboid major, L teres major and minor, L infraspinatus Winding performed initially. E-stim applied through 3 channels at 2 Hz and 2-4 mA to tolerance.    Time Entry(in minutes):  E-Stim (Unattended) Time Entry: 10  Hot/Cold Pack Time Entry: 10 (with exercises as noted)  Neuromuscular Re-Education Time Entry: 10  Therapeutic Activity Time Entry: 24  Therapeutic Exercise Time Entry: 6  Number of Muscles Needled: 6  Needle Insertions Time Entry: 15    Assessment & Plan   Assessment  Patient has responded favorably to dry needling with improvement in shoulder and neck sx as well as reduced tingling/numbness in the arm/hand allowing her to sleep through the night. Initiated e-stim today with dry needling to promote muscular inhibition in shoulder, neck and periscapular region. Improvement in scapular stability and positioning with overhead shoulder motions also improve sx and activity tolerance.  strength improved from previous re-assessment by 4# which may be attributed to reduced sx in the  hand. She will continue to benefit from additional skilled physical therapy to address remaining impairments as noted above including tissue tension, posture, tingling/numbness in left hand, dropping objects, weakness, and elbow pain. These impairments continue to negatively affect the patient's Activities of daily living and participation with work, recreational and houseold duties.  Evaluation/Treatment Tolerance: Patient tolerated treatment well  Plan  From a physical therapy perspective, the patient would benefit from: Skilled Rehab Services    Planned therapy interventions include: Therapeutic exercise, Therapeutic activities, Neuromuscular re-education, Manual therapy, ADLs/IADLs, and Other (Comment). Dry Needling (prn)  Planned modalities to include: Biofeedback, Electrical stimulation - attended, Electrical stimulation - passive/unattended, Thermotherapy (hot pack), and Cryotherapy (cold pack).        Visit Frequency: 1 times Per Week for 12 Weeks.       This plan was discussed with Patient.   Discussion participants: Agreed Upon Plan of Care  Plan details: Frequency and duration of treatment to be adjusted as needed. Patient will be seen by a physical therapist minimally every 30 days.          The patient will continue to benefit from skilled outpatient physical therapy in order to address the deficits listed in the problem list on the initial evaluation, provide patient and family education, and maximize the patients level of independence in the home and community environments.     The patient's spiritual, cultural, and educational needs were considered, and the patient is agreeable to the plan of care and goals.     Education  Education was done with Patient.           Update to dry needling billing practice; updated plan of care and re-assessment findings        Goals:   Active       1. short term goal        Patient will be independent with home exercise program to promote improved therapy outcomes.    (Ongoing)       Start:  04/22/25    Expected End:  10/08/25            Patient will  improve bilateral scapular Manual Muscle tests to = 4/5 to promote equal use of bilateral upper extremities in performing functional tasks. (Progressing)       Start:  04/22/25    Expected End:  10/08/25            Patient will be able to hold phone and other objects in the left hand for longer periods of time without dropping to improve  functional use of left upper extremity and quality of life.  (Progressing)       Start:  04/22/25    Expected End:  10/08/25               2. long term goal        Patient will improve DASH by 10 point to demonstrate improved functional mobility and use of the arm.  (Progressing)       Start:  04/22/25    Expected End:  10/08/25            Patient will report pain </= 2/10 with Activities of daily living using left upper extremity to improve bathing, dressing, grooming and reaching activities.  (Progressing)       Start:  04/22/25    Expected End:  10/08/25            Patient will  improve left upper extremity  shoulder Manual Muscle tests to = 4+/5 to promote equal use of bilateral upper extremities in performing functional tasks. (Progressing)       Start:  04/22/25    Expected End:  10/08/25                Kendra Rogers, PT

## 2025-07-23 ENCOUNTER — CLINICAL SUPPORT (OUTPATIENT)
Dept: REHABILITATION | Facility: OTHER | Age: 35
End: 2025-07-23
Payer: COMMERCIAL

## 2025-07-23 DIAGNOSIS — M79.602 LEFT ARM PAIN: Primary | ICD-10-CM

## 2025-07-23 DIAGNOSIS — R29.898 LEFT ARM WEAKNESS: ICD-10-CM

## 2025-07-23 PROCEDURE — 97530 THERAPEUTIC ACTIVITIES: CPT | Mod: PN

## 2025-07-23 PROCEDURE — 20561 NDL INSJ W/O NJX 3+ MUSC: CPT | Mod: PN

## 2025-07-23 PROCEDURE — 97112 NEUROMUSCULAR REEDUCATION: CPT | Mod: PN

## 2025-07-23 PROCEDURE — 97014 ELECTRIC STIMULATION THERAPY: CPT | Mod: PN

## 2025-07-23 NOTE — PROGRESS NOTES
Outpatient Rehab    Physical Therapy Visit    Patient Name: Lea Mustafa  MRN: [id number]  YOB: 1990  Encounter Date: 7/23/2025    Therapy Diagnosis:   Encounter Diagnoses   Name Primary?    Left arm pain Yes    Left arm weakness      Physician: Edvin Luis MD    Physician Orders: Eval and Treat  Medical Diagnosis: Acute pain of left shoulder  Surgical Diagnosis: Not applicable for this Episode   Surgical Date: Not applicable for this Episode  Days Since Last Surgery: Not applicable for this Episode    Visit # / Visits Authorized:  13 / 23  Insurance Authorization Period: 4/22/2025 to 12/31/2025  Date of Evaluation: 4/22/2025  Plan of Care Certification: 7/16/2025 to 10/8/2025      PT/PTA: PT   Number of PTA visits since last PT visit:0  Time In: 0705   Time Out: 0800  Total Time (in minutes): 55   Total Billable Time (in minutes): 55    FOTO:  Intake Score (%): 45  Survey Score 2 (%): 47  Survey Score 3 (%): 49    Precautions:  Additional Precautions and Protocol Details: Standard      Subjective   she had the EMG done and was told she needs carpal tunnel surgery for the hand sx. She expresses a lot of concern over having to need surgery and asks about the process. Shoulder and neck were doing great until she had that done but even then, the sx are not as bad as they have been. Needling seems to definitely be helping with them..  Pain reported as 3/10. L arm/hand tingling    Objective            Treatment:  Balance/Neuromuscular Re-Education  NMR 1: Row with Blue TB 10x 10 second holds ; extension with blue TB 10x 10 second holds  NMR 6: Pulleys into flexion/scaption with scapular blocking 3 minutes each and MHP  NMR 8: no money with RTB 2x 10  Therapeutic Activity  TA 1: Education re: Carpal tunnel release  Modalities  Dry Needling: Application of TDN: Pt educated on benefits and potential side effects of dry needling. Educated pt on benefits, precautions, side effects following TDN.  Educated pt to use heat following treatment sessions if pt is experiencing pain or soreness. Pt verbalized good understanding of education. Pt signed written consent to dry needling. Pt gave verbal consent for DN Pt received dry needling to the below listed muscles using 30mm needles. Left C3, 5, 7 paraspinals, L levator scap, L rhomboid major, L teres major and minor, L infraspinatus Winding performed initially. E-stim applied through 3 channels at 2 Hz and 2-4 mA to tolerance.    Time Entry(in minutes):  E-Stim (Unattended) Time Entry: 10  Hot/Cold Pack Time Entry: 6  Neuromuscular Re-Education Time Entry: 25  Therapeutic Activity Time Entry: 15  Number of Muscles Needled: 6  Needle Insertions Time Entry: 15    Assessment & Plan   Assessment: Sx continue to improve with dry needling and scapular stabilization activities. Continue with emphasis on posture and progression of scapular stabilizing exercises as tolerated.   Evaluation/Treatment Tolerance: Patient tolerated treatment well    The patient will continue to benefit from skilled outpatient physical therapy in order to address the deficits listed in the problem list on the initial evaluation, provide patient and family education, and maximize the patients level of independence in the home and community environments.     The patient's spiritual, cultural, and educational needs were considered, and the patient is agreeable to the plan of care and goals.     Education  Education was done with Patient.           Carpal tunnel release recovery time frame and tissue healing time, OT afterwards, and emphasizing commonality of procedure; how they determined carpal tunnel was involved        Plan: continue as per established PT POC with postural re-training and scapular strength/stabilization. monitor effects of dry needling.    Goals:   Active       1. short term goal        Patient will be independent with home exercise program to promote improved therapy outcomes.    (Ongoing)       Start:  04/22/25    Expected End:  10/08/25            Patient will  improve bilateral scapular Manual Muscle tests to = 4/5 to promote equal use of bilateral upper extremities in performing functional tasks. (Progressing)       Start:  04/22/25    Expected End:  10/08/25            Patient will be able to hold phone and other objects in the left hand for longer periods of time without dropping to improve  functional use of left upper extremity and quality of life.  (Progressing)       Start:  04/22/25    Expected End:  10/08/25               2. long term goal        Patient will improve DASH by 10 point to demonstrate improved functional mobility and use of the arm.  (Progressing)       Start:  04/22/25    Expected End:  10/08/25            Patient will report pain </= 2/10 with Activities of daily living using left upper extremity to improve bathing, dressing, grooming and reaching activities.  (Progressing)       Start:  04/22/25    Expected End:  10/08/25            Patient will  improve left upper extremity  shoulder Manual Muscle tests to = 4+/5 to promote equal use of bilateral upper extremities in performing functional tasks. (Progressing)       Start:  04/22/25    Expected End:  10/08/25                Kendra Rogers, PT

## 2025-08-13 ENCOUNTER — CLINICAL SUPPORT (OUTPATIENT)
Dept: REHABILITATION | Facility: OTHER | Age: 35
End: 2025-08-13
Payer: COMMERCIAL

## 2025-08-13 DIAGNOSIS — M79.602 LEFT ARM PAIN: Primary | ICD-10-CM

## 2025-08-13 DIAGNOSIS — R29.898 LEFT ARM WEAKNESS: ICD-10-CM

## 2025-08-13 PROCEDURE — 97110 THERAPEUTIC EXERCISES: CPT | Mod: PN

## 2025-08-20 ENCOUNTER — CLINICAL SUPPORT (OUTPATIENT)
Dept: REHABILITATION | Facility: OTHER | Age: 35
End: 2025-08-20
Payer: COMMERCIAL

## 2025-08-20 DIAGNOSIS — R29.898 LEFT ARM WEAKNESS: ICD-10-CM

## 2025-08-20 DIAGNOSIS — M79.602 LEFT ARM PAIN: Primary | ICD-10-CM

## 2025-08-20 PROCEDURE — 97110 THERAPEUTIC EXERCISES: CPT | Mod: PN

## 2025-08-20 PROCEDURE — 97112 NEUROMUSCULAR REEDUCATION: CPT | Mod: PN
